# Patient Record
Sex: FEMALE
[De-identification: names, ages, dates, MRNs, and addresses within clinical notes are randomized per-mention and may not be internally consistent; named-entity substitution may affect disease eponyms.]

---

## 2017-04-20 VITALS — RESPIRATION RATE: 18 BRPM

## 2017-04-21 ENCOUNTER — HOSPITAL ENCOUNTER (OUTPATIENT)
Dept: HOSPITAL 14 - H.OPSURG | Age: 77
Discharge: HOME | End: 2017-04-21
Attending: PODIATRIST
Payer: MEDICARE

## 2017-04-21 VITALS
TEMPERATURE: 98.4 F | HEART RATE: 65 BPM | OXYGEN SATURATION: 96 % | DIASTOLIC BLOOD PRESSURE: 63 MMHG | SYSTOLIC BLOOD PRESSURE: 117 MMHG

## 2017-04-21 VITALS — BODY MASS INDEX: 28.7 KG/M2

## 2017-04-21 DIAGNOSIS — F41.9: ICD-10-CM

## 2017-04-21 DIAGNOSIS — M21.6X1: Primary | ICD-10-CM

## 2017-04-21 DIAGNOSIS — I25.2: ICD-10-CM

## 2017-04-21 DIAGNOSIS — I48.91: ICD-10-CM

## 2017-04-21 DIAGNOSIS — K21.9: ICD-10-CM

## 2017-04-21 DIAGNOSIS — E78.5: ICD-10-CM

## 2017-04-21 DIAGNOSIS — I11.0: ICD-10-CM

## 2017-04-21 DIAGNOSIS — I25.10: ICD-10-CM

## 2017-04-21 DIAGNOSIS — M77.8: ICD-10-CM

## 2017-04-21 DIAGNOSIS — I50.9: ICD-10-CM

## 2017-04-21 DIAGNOSIS — M21.621: ICD-10-CM

## 2017-04-21 PROCEDURE — 88304 TISSUE EXAM BY PATHOLOGIST: CPT

## 2017-04-21 PROCEDURE — 20680 REMOVAL OF IMPLANT DEEP: CPT

## 2017-04-21 PROCEDURE — 28173 RESECT METATARSAL TUMOR: CPT

## 2017-04-21 PROCEDURE — 97161 PT EVAL LOW COMPLEX 20 MIN: CPT

## 2017-04-21 PROCEDURE — 73620 X-RAY EXAM OF FOOT: CPT

## 2017-04-21 PROCEDURE — 97116 GAIT TRAINING THERAPY: CPT

## 2017-04-21 NOTE — CP.PCM.PN
Subjective





- Date & Time of Evaluation


Date of Evaluation: 17


Time of Evaluation: 10:45





- Subjective


Subjective: 


78 yo female patient seen and evaluated in [facility] this morning for pre-op 

evaluation. Patient is accompanied with family member. Patient says she has 

been having quite a bit of pain to the right foot. Says she had bunion surgery 

4 years ago and now has pain due to the plate. Also has noticed a bunion 

formation of the 5th toe, says this is painful when ambulating and gets 

calluses. Rates the pain as a 5/10 at its worst when ambulating. Takes Tylenol 

for pain which she says helps somewhat. Has a walker at home. Denies anything 

to eat or drink since midnight. Denies any other problems at this time. 





PMH: arrythmia (has pacemaker), thyroid disease, arthritis


ALL: NKDA


Meds: see MAR 


Surg Hx: right bunion surgery 4 years ago, stents, bypass


FH: mother (clotting disorder), father ( from stroke)


Soc Hx: lives at home with , past smoker (quite 30 years ago), denies 

etoh, denies illicit drug use





Objective





- Vital Signs/Intake and Output


Vital Signs (last 24 hours): 


 











Temp Pulse Resp BP Pulse Ox


 


 98.5 F   73   18   131/72   98 


 


 17 10:10  17 10:16  17 10:10  17 10:10  17 10:10











- Constitutional


Appears: Well, Non-toxic, No Acute Distress





- Extremities Exam


Additional comments: 


Bilateral lower extremity exam:





VASC- DP pulses palpable 2/4 BL, PT pulses palpable 1/4 BL, skin temp runs warm 

to cool bl, no pedal edema noted, diffuse varicosities noted to distal legs and 

lateral ankles bl





NEURO- gross pedal sensation is intact BL





DERM: no open wounds, there is a hyperkeratotic lesion (IPK) noted sub-

metatarsal 1 (right) foot as well as plantar-lateral aspect of the 5th 

metatarsal head (right), no erythema, no sings infection 





ORTHO: tenderness to palpation to IPK sub-met 1 (right), tenderness to 

palpation of plantar and lateral aspect of 5th met head (right), tenderness to 

palpation medial and dorsal aspect of 1st MTPJ (right), severe HAV deformity 

noted to left 1st MTPJ with rigid hammering of left 2nd digit





BIOMECHANICAL EXAM:


AJ: DF is restricted to 0 degrees past neutral BL


STJ: 25 degrees inversion, 15 degrees eversion BL


1st ray: limited DF noted BL (0mm right, 2mm left)


1st MTPJ: absent DF, PF on right side, joint is trackbound on left side


Pedal muscle strength: graded as a 5/5 in all directions 





GAIT EXAM: patient ambulates with walker for balance, antalgic gait noted, 

decreased henry, increased angle and base of gait, lack of propulsion on 

right side and lack of swing phase bl, increased midstance on left side, 





- Neurological Exam


Neurological Exam: Alert, Awake, Oriented x3





- Psychiatric Exam


Psychiatric exam: Normal Affect, Normal Mood





Assessment and Plan





- Assessment and Plan (Free Text)


Assessment: 


78 yo female patient w/ painful right foot secondary to 1) painful hardware, 2) 

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 











Plan: 


-Pt S&E in same day surgery


-NPO status confirmed with patient


-Pt is for surgery of the right foot today w/ Dr. Bernabe


-Medical clearance in chart 


-Pt is to be WBAT with surgical shoe and walker post-op.


-She is to f/u w/ Dr. Bernabe at the office next week

## 2017-04-21 NOTE — CP.SDSHP
Same Day Surgery H & P





- History


Proposed Procedure: Right foot 5th metatarsal osteotomy, removal painful 

hardware 1st metatarsalphlangeal joint, fibular sesamoidectomy


Pre-Op Diagnosis: hallux abductovalgus 5th metatarsophalangeal joint, painful 

hardware 1st metatarsophalangeal joint, painful sesamoid (all of right foot)





- Previous Medical/Surgical History


Cardiac: Arrhythmia (has pacemaker )


Endocrine/Metabolic: Thyroid Disease


Pain: 5.


Previous Surgical History: hallux abductovalgus repair (fursion) right 1st 

metatarophalangeal joint.  stents, bypass





- Allergies


Allergies: 


Allergies





No Known Allergies Allergy (Verified 04/20/17 10:22)


 











- Physical Exam


Vital Signs: 


 Vital Signs











  04/21/17 04/21/17





  10:10 10:16


 


Temperature 98.5 F 


 


Pulse Rate 73 73


 


Respiratory 18 





Rate  


 


Blood Pressure 131/72 


 


O2 Sat by Pulse 98 





Oximetry  











Mental Status: Alert & Oriented x3


Neuro: WNL





- {Optional Preform as Required}


Integument: Other (hyperkeratotic lesion submet 5 and 1 of right foot)


Ortho: Other (pain to palpation of dorsal and medial aspect of right 1st MTPJ, 

tenderness to palpation of plantar and lateral aspect 5th metatarsal head)





- Impression


Impression: Patient seen and evaluated in [facility]. Medical clearance in chart. NPO 

status confirmed. Pt is for right foot surgery today with Dr. Bernabe





Short Stay Discharge





- Short Stay Discharge


Admitting Diagnosis/Reason for Visit: M21.6X1 M21.621 M77.8


Disposition: HOME/ ROUTINE


Referrals: 


Santiago Carranza MD [Primary Care Provider] - 


Stephen Bernabe MD [Staff Provider] - 


Additional Instructions (Diet, Activity): 


-Rest, ice and elevate the right foot today.


-Keep dressing to foot clean, dry, intact, use cast bag over dressing for 

bathing/showering


-Take prescribed medications as directed 


-PWB to heel with walker


-Pt is advised to monitor for any signs of infection


-Pt to follow up with Dr. Bernabe at the office next week


Progress Note/Discharge Note with Instructions: 


-VSS, neurovascular status intact right foot


- (+) void, (+) appetite


-dressing to right foot appears clean dry and intact 


-PWB to heel with rolling walker 


-Follow up with Dr. Bernabe in office next week

## 2017-04-21 NOTE — RAD
PROCEDURE:  Right Foot Radiographs. For 



HISTORY:

Right foot surgery  



COMPARISON:

None.



FINDINGS:



BONES:

There is fusion of the 1st MTP joint and screw tracks in the 1st 

metatarsal and proximal phalanx of the great toe. There are 2 

metallic screws in the 5th metatarsal and an old fracture deformity. 

There is no acute fracture or bone destruction. There is a prominent 

dorsal calcaneal spur. 



JOINTS:

The remaining joint spaces are preserved. 



SOFT TISSUES:

There is moderate dorsal soft tissue swelling 



OTHER FINDINGS:

None.



IMPRESSION:

Old fracture deformity in the 5th metatarsal and 2 metallic screws in 

the 5th metatarsal. Moderate dorsal soft tissue swelling.

## 2017-04-21 NOTE — PCM.SURG1
Surgeon's Initial Post Op Note





- Surgeon's Notes


Surgeon: Dr. Bernabe


Assistant: Dr. Taiwo Arriaga


Type of Anesthesia: MAC, Local


Anesthesia Administered By: Dr. Suazo


Pre-Operative Diagnosis: Right foot painful hardware, painful exostosis of 1st 

metatarsal head, tailor's bunion deformity, and painful scar tissue to plantar 

aspect of 1st met head


Operative Findings: Materials: 2.0 x 10mm cortical screws.  3-0 Vicryl.  4-0 

Monocryl


Post-Operative Diagnosis: same


Operation Performed: Right foot removal of painful hardware, 5th metatarsal 

osteotomy with screws, resection of 1st metatarsal head, excision of painful 

scar tissue from plantar 1st met head


Specimen/Specimens Removed: 6 x screws and 1 x plate from right 1st MTPJ


Estimated Blood Loss: EBL {In ML}: 3


Blood Products Given: N/A


Drains Used: No Drains


Post-Op Condition: Good


Date of Surgery/Procedure: 04/21/17


Time of Surgery/Procedure: 11:40

## 2017-04-23 NOTE — OP
PROCEDURE DATE: 04/21/2017



SURGEON:  Dr. Bernabe



ASSISTANT:  Dr. Arriaga, PGY-1



ANESTHETIST:  Dr. Suazo 



ANESTHESIA:  General Mask and local.



PREOPERATIVE DIAGNOSES:

1.  Right foot painful hardware.

2.  Right foot painful scar tissue to plantar aspect of first metatarsal head.

3.  Right foot painful plantar exostosis of first metatarsal head.

4.  Right foot painful tailor's bunion.



POSTOPERATIVE DIAGNOSES:  

1.  Right foot painful hardware.

2.  Right foot painful scar tissue to plantar aspect of first metatarsal head.

3.  Right foot painful plantar exostosis of first metatarsal head.

4.  Right foot painful tailor's bunion.



NAME OF PROCEDURE:

1.  Right foot removal of painful hardware.

2.  Right foot excision of scar tissue from plantar aspect of first metatarsal 
head.

3.  Right foot resection of first metatarsal head.

4.  Right foot fifth metatarsal Timothy osteotomy.



INDICATIONS:  The patient is a 77-year-old female patient with the above 
diagnosis.  The patient has exhausted conservative treatment at this time and 
now requests surgical intervention.  The patient signed the consent after 
careful explanation of risks, benefits, complication and alternatives for 
surgical procedure.  No guarantees were given nor implied.  One gram of Ancef 
IV was given to the patient half hour prior to the procedure.  N.p.o. status 
was confirmed prior to taking patient to the OR.



PREPARATION:  The patient was brought to the operating room table and placed on 
the operating room table in supine position.  A well-padded pneumatic ankle 
tourniquet was placed to patient's right ankle.  After induction of general 
anesthesia, the patient received a total of 19 mL of 1:1 mixture of 1% 
lidocaine plain and 0.5% Marcaine plain in a local block fashion to the right 
ankle.  Once local anesthesia was achieved, the right lower extremity was then 
prepped and draped in usual sterile manner.  Esmarch was utilized to 
exsanguinate the patient's right foot.  Pneumatic ankle tourniquet was then 
inflated to 250 mmHg and the procedure began.



PROCEDURE: 

1.  Right foot removal of painful hardware.  Attention was directed to the 
dorsal aspect of the right first MTPJ.  Exact location of painful metal plate 
was identified by palpation of the prominent plate.  An approximately 5 cm 
longitudinal incision was made along the length of the plate with a #15 blade.  
The incision was deepened through the subcutaneous tissues using sharp and 
blunt dissection all the way down to the plate and bone.  Care was taken to 
identify and retract all vital neurovascular structures.  The plate and the 
screws  were visualized through the incision.  Using a proper Arthrex 
cannulated star shaped screwdriver, the screws were then removed from the right 
first MTPJ.  The plate was then removed from the right first MTPJ utilizing a 
Custer elevator and a hemostat.  The wound was then flushed with a copious 
amount of sterile normal saline solution.  The subcutaneous tissue of the 
incision site was then reapproximated and coapted utilizing #3-0 Vicryl.  The 
skin was then reapproximated and coapted using 4-0 Monocryl with running suture 
technique.

2.  Right foot excision of scar tissue from plantar aspect of first metatarsal 
head.  Attention was then directed to the plantar aspect of the right first 
metatarsal head where there is a hyperkeratotic lesion measuring about 0.5 cm 
in diameter.  At this point, a #15 blade was used to make elliptical incision 
on the margin of the lesion plantar.  Using a brown adson, the lesion was 
lifted and deeper incision was made using the blade.  This hyperkeratotic 
lesion was then removed from the plantar aspect of the right foot first 
metatarsal head and was passed from the operative field to the sterile Bucio 
stand.

3.  Right foot resection of first metatarsal head.  Once the excision of the 
scar tissue was done, prominent exostosis of the plantar lateral aspect of the 
right first metatarsal head was palpated through the plantar opening where the 
scar tissue was removed from.  The incision was deepened through the 
subcutaneous tissues using sharp and blunt dissection all the way down to the 
bone.  Care was taken to identify and retract all vital neurovascular 
structures.  Once the prominent exostosis of right first metatarsal head was 
exposed through the incision,  a rongeur was used to snip off the prominent 
exostosis from the plantar lateral aspect of the first metatarsal head.  The 
resected bony exostosis was then passed from the operative field to sterile 
Bucio stand.  The wound was then flushed with copious amount of sterile normal 
saline solution.  The subcutaneous tissue of the incision site was then 
reapproximated and coapted utilizing 3-0 Vicryl.  The skin was then 
reapproximated and coapted utilizing 4-0 Prolene with simple suture technique.

4.  Right foot fifth metatarsal Timothy osteotomy.  Attention was then directed to 
the dorsal aspect of the fifth metatarsal of the right foot.  Utilizing a #15 
blade, approximately a 5 cm longitudinal incision was made on the dorsal aspect 
of the right foot fifth metatarsal.  The incision was deepened through the 
subcutaneous tissue with care being taken to identify and retract all vital 
neurovascular structures.  All bleeders were cauterized and ligated as 
necessary.  At this time, a linear periosteal and capsular incision was made 
overlying the fifth metatarsal of the right foot.  The periosteal and capsular 
structures were then carefully dissected free of their osseous attachments and 
reflected medially and laterally, thus exposing the head and shaft of the fifth 
metatarsal.  Once the head of the right fifth metatarsal head was exposed, it 
was noted that the lateral aspect of the fifth metatarsal was already resected 
from a previous surgery.  Next, utilizing a sagittal saw, a dorsal distal to 
plantar proximal osteotomy was created in the distal one-half of the fifth 
metatarsal diaphysis.  Upon completion of the osteotomy, the capital fragment 
was distracted and shifted medially into a corrected position and held in place 
utilizing a bone clamp.  Next, two 2.0 x 10 mm cortical screws were inserted 
into their places across the osteotomy site with excellent compression noted.  
The remaining bone clamp was removed.  Attention was then directed to the 
remaining lateral bone shelf which was resected utilizing the sagittal bone 
saw.  All rough edges were smoothed down with a hand rasp.  The wound was then 
flushed with copious amounts of sterile normal saline.  The periosteal and 
capsular structures were reapproximated and coapted utilizing 3-0 Vicryl.  The 
subcuticular tissues were then reapproximated and coapted utilizing 4-0 Vicryl.
  The subcuticular was then reapproximated and coapted using 4-0 Monocryl in a 
running suture technique.  



Right foot surgical sites were injected with total of 10 mL of 0.5% Marcaine 
plain and 3 mL of dexamethasone phosphate.  Right foot was dressed with Steri-
Strips, DSD and Tensoplast.  Pneumatic ankle tourniquet was then deflated and 
prompt hyperemic response was noted to all digits of the right foot.  The 
attending was present the entire case.



POSTOPERATIVE CONDITION:  The patient tolerated the anesthesia and procedure 
well and was escorted to the recovery room with vital signs stable and 
neurovascular status intact to the right foot.  The patient will be 
nonweightbearing in surgical shoe with crutches and follow up with Dr. Bernabe.





__________________________________________

KAMRAN ARRIAGA DPM



__________________________________________

Stephen Bernabe DPM







cc:   



DD: 04/23/2017 18:57:55  [id number]

TT: 04/23/2017 21:03:37

Job # [id number]

sn

MTDD

## 2017-10-24 ENCOUNTER — HOSPITAL ENCOUNTER (INPATIENT)
Dept: HOSPITAL 14 - H.ER | Age: 77
LOS: 1 days | Discharge: HOME HEALTH | DRG: 293 | End: 2017-10-25
Attending: INTERNAL MEDICINE | Admitting: INTERNAL MEDICINE
Payer: MEDICARE

## 2017-10-24 VITALS — BODY MASS INDEX: 27.1 KG/M2

## 2017-10-24 DIAGNOSIS — Z95.810: ICD-10-CM

## 2017-10-24 DIAGNOSIS — Z79.82: ICD-10-CM

## 2017-10-24 DIAGNOSIS — E03.9: ICD-10-CM

## 2017-10-24 DIAGNOSIS — E78.5: ICD-10-CM

## 2017-10-24 DIAGNOSIS — Z95.1: ICD-10-CM

## 2017-10-24 DIAGNOSIS — I50.23: ICD-10-CM

## 2017-10-24 DIAGNOSIS — D50.9: ICD-10-CM

## 2017-10-24 DIAGNOSIS — M81.0: ICD-10-CM

## 2017-10-24 DIAGNOSIS — I25.10: ICD-10-CM

## 2017-10-24 DIAGNOSIS — I48.2: ICD-10-CM

## 2017-10-24 DIAGNOSIS — I11.0: Primary | ICD-10-CM

## 2017-10-24 DIAGNOSIS — F41.9: ICD-10-CM

## 2017-10-24 DIAGNOSIS — Z87.891: ICD-10-CM

## 2017-10-24 DIAGNOSIS — K21.9: ICD-10-CM

## 2017-10-24 DIAGNOSIS — I25.2: ICD-10-CM

## 2017-10-24 DIAGNOSIS — E87.6: ICD-10-CM

## 2017-10-24 LAB
ALBUMIN/GLOB SERPL: 1.3 {RATIO} (ref 1–2.1)
ALP SERPL-CCNC: 117 U/L (ref 38–126)
ALT SERPL-CCNC: 54 U/L (ref 9–52)
APTT BLD: 32.2 SECONDS (ref 25.6–37.1)
AST SERPL-CCNC: 43 U/L (ref 14–36)
BASOPHILS # BLD AUTO: 0 K/UL (ref 0–0.2)
BASOPHILS NFR BLD: 0.3 % (ref 0–2)
BILIRUB SERPL-MCNC: 1.1 MG/DL (ref 0.2–1.3)
BILIRUB UR-MCNC: NEGATIVE MG/DL
BUN SERPL-MCNC: 26 MG/DL (ref 7–17)
CALCIUM SERPL-MCNC: 8.9 MG/DL (ref 8.4–10.2)
CHLORIDE SERPL-SCNC: 101 MMOL/L (ref 98–107)
CO2 SERPL-SCNC: 27 MMOL/L (ref 22–30)
COLOR UR: (no result)
EOSINOPHIL # BLD AUTO: 0 K/UL (ref 0–0.7)
EOSINOPHIL NFR BLD: 0.6 % (ref 0–4)
ERYTHROCYTE [DISTWIDTH] IN BLOOD BY AUTOMATED COUNT: 17.4 % (ref 11.5–14.5)
GLOBULIN SER-MCNC: 3.4 GM/DL (ref 2.2–3.9)
GLUCOSE SERPL-MCNC: 130 MG/DL (ref 65–105)
GLUCOSE UR STRIP-MCNC: (no result) MG/DL
HCT VFR BLD CALC: 32.8 % (ref 34–47)
KETONES UR STRIP-MCNC: NEGATIVE MG/DL
LEUKOCYTE ESTERASE UR-ACNC: (no result) LEU/UL
LYMPHOCYTES # BLD AUTO: 1 K/UL (ref 1–4.3)
LYMPHOCYTES NFR BLD AUTO: 16.3 % (ref 20–40)
MCH RBC QN AUTO: 25.4 PG (ref 27–31)
MCHC RBC AUTO-ENTMCNC: 33.1 G/DL (ref 33–37)
MCV RBC AUTO: 76.6 FL (ref 81–99)
MONOCYTES # BLD: 0.5 K/UL (ref 0–0.8)
MONOCYTES NFR BLD: 8.3 % (ref 0–10)
NEUTROPHILS # BLD: 4.8 K/UL (ref 1.8–7)
NEUTROPHILS NFR BLD AUTO: 74.5 % (ref 50–75)
NRBC BLD AUTO-RTO: 0.1 % (ref 0–0)
PH UR STRIP: 6 [PH] (ref 5–8)
PLATELET # BLD: 177 K/UL (ref 130–400)
PMV BLD AUTO: 9.5 FL (ref 7.2–11.7)
POTASSIUM SERPL-SCNC: 3.3 MMOL/L (ref 3.6–5)
PROT SERPL-MCNC: 7.6 G/DL (ref 6.3–8.2)
PROT UR STRIP-MCNC: NEGATIVE MG/DL
RBC # UR STRIP: (no result) /UL
RBC #/AREA URNS HPF: < 1 /HPF (ref 0–3)
SODIUM SERPL-SCNC: 140 MMOL/L (ref 132–148)
SP GR UR STRIP: 1.01 (ref 1–1.03)
UROBILINOGEN UR-MCNC: (no result) MG/DL (ref 0.2–1)
WBC # BLD AUTO: 6.4 K/UL (ref 4.8–10.8)

## 2017-10-24 NOTE — CP.PCM.HP
History of Present Illness





- History of Present Illness


History of Present Illness: 





                                                        this 77-year-old female 

came to the emergency room after multiple attempts to relieve her of shortness 

of breath and pedal edema at home by using oral furosemide and Aldactone were 

unsuccessful. The patient has a long medical history which consists of coronary 

artery disease causing a myocardial infarction in early 80s. The patient 

subsequently underwent coronary bypass graft surgery and also has had stenting 

procedures approximately 5-6 years back. She has had severe left ventricular 

systolic dysfunction and has had an AICD implanted more than 6 years back. The 

patient subsequently developed atrial fibrillation couple of years back and had 

had significant congestive cardiac failure treated with oral Diuril takes along 

with an ACE inhibitor and a beta blocker. She has never been a diabetic. The 

patient has undergone a cardioversion approximately 2 months back so as to 

improve her cardiac output and relieve symptoms of congestive cardiac failure. 

She has had biventricular pacing. Subsequently she reverted to atrial 

fibrillation and couple of weeks back she underwent AV node ablation and now is 

dependent on biventricular VVIR pacing.


She recently developed a mild shortness of breath and a persistent pedal edema 

which was not relieved in spite of doubling her furosemide dose and adding 

Aldactone. In fact her weight went up by a pound and the patient finally 

decided to come to the emergency room. She denies any orthopnea. She denies any 

salt loading.


On physical examination this is an elderly female who is quite alert awake and 

coherent. Afebrile with a pulse rate of 88 bpm and regular and cardiac monitor 

shows VVI paced rhythm.her blood pressure was 124/72 mmHg. Her jugular venous 

pressure was mildly elevated and there was pitting edema particularly over the 

right lower extremity  from which venous harvesting had been done in the past. 

There was a mild pitting edema over the left lower extremity as well. her 

extremities were warm and the nailbeds were pink. There was no central or 

peripheral cyanosis.Scar of sternotomy was evident. The apex was palpable in 

the 60s slightly heaving in character. First and second heart sounds were 

normal with a brief apical systolic murmur of mitral regurgitation. There were 

few basal rales.


Electro-cardiogram showed presence of atrial fibrillation with a VVI paced 

rhythm. Chest x-ray showed cardiomegaly with mild pulmonary congestion. 

Ultrasound of lower extremities failed to show any evidence of DVT.


Her lab data showed microcytic hypochromic anemia with a hemoglobin of 10.9 g 

and 32.8% respectively. Her WBC count and platelet counts are within normal 

limits. Her BUN and creatinine were 26 and 1 mg percent respectively with a 

potassium level of 3.3 mg/L. Her pro BNP was markedly elevated troponin was 

normal.





Impression: congestive cardiac failure which is left ventricular systolic and 

acute on chronic. Coronary artery disease with prior myocardial infarcts. 

Status post coronary bypass graft surgery. Chronic atrial fibrillation. 

Hypertension and dyslipidemia. Status post AV ablation with AICD implant and 

biventricular pacing.


The patient has received intravenous furosemide in an attempt to diurese her.





Present on Admission





- Present on Admission


Any Indicators Present on Admission: No





Past Patient History





- Infectious Disease


Hx of Infectious Diseases: None





- Past Medical History & Family History


Past Medical History?: Yes





- Past Social History


Alcohol: None


Drugs: Denies





- CARDIAC


Hx Congestive Heart Failure: Yes


Hx Hypercholesterolemia: Yes


Hx Hypertension: Yes


Hx Pacemaker: Yes (2012)





- PULMONARY


Hx Respiratory Disorders: No





- NEUROLOGICAL


Hx Neurological Disorder: No





- HEENT


Hx HEENT Problems: No





- RENAL


Hx Chronic Kidney Disease: No





- ENDOCRINE/METABOLIC


Hx Hypothyroidism: Yes





- HEMATOLOGICAL/ONCOLOGICAL


Hx Blood Disorders: Yes


Hx Bruising: Yes





- INTEGUMENTARY


Hx Dermatological Problems: No





- MUSCULOSKELETAL/RHEUMATOLOGICAL


Hx Arthritis: Yes


Hx Osteoporosis: Yes





- GASTROINTESTINAL


Hx Gastrointestinal Disorders: Yes


Hx Gastroesophageal Reflux: Yes (HEARTBURN)





- GENITOURINARY/GYNECOLOGICAL


Hx Genitourinary Disorders: No





- PSYCHIATRIC


Hx Anxiety: Yes





- SURGICAL HISTORY


Hx Coronary Artery Bypass Graft: Yes (CARDIAC [id number])





- ANESTHESIA


Hx Anesthesia: Yes


Hx Anesthesia Reactions: No


Hx Malignant Hyperthermia: No





Meds


Allergies/Adverse Reactions: 


 Allergies











Allergy/AdvReac Type Severity Reaction Status Date / Time


 


No Known Allergies Allergy   Verified 04/20/17 10:22














Results





- Vital Signs


Recent Vital Signs: 





 Last Vital Signs











Temp  98.1 F   10/24/17 12:26


 


Pulse  84   10/24/17 12:26


 


Resp  18   10/24/17 12:26


 


BP  104/83   10/24/17 12:26


 


Pulse Ox  95   10/24/17 12:32














- Labs


Result Diagrams: 


 10/24/17 10:50





 10/24/17 10:50


Labs: 





 Laboratory Results - last 24 hr











  10/24/17 10/24/17 10/24/17





  10:50 10:50 10:50


 


WBC   6.4 


 


RBC   4.29 


 


Hgb   10.9 L 


 


Hct   32.8 L 


 


MCV   76.6 L 


 


MCH   25.4 L 


 


MCHC   33.1 


 


RDW   17.4 H 


 


Plt Count   177 


 


MPV   9.5 


 


Neut % (Auto)   74.5 


 


Lymph % (Auto)   16.3 L 


 


Mono % (Auto)   8.3 


 


Eos % (Auto)   0.6 


 


Baso % (Auto)   0.3 


 


Neut #   4.8 


 


Lymph #   1.0 


 


Mono #   0.5 


 


Eos #   0.0 


 


Baso #   0.0 


 


PT    24.0 H


 


INR    2.1 H


 


APTT    32.2


 


Sodium  140  


 


Potassium  3.3 L  


 


Chloride  101  


 


Carbon Dioxide  27  


 


Anion Gap  15  


 


BUN  26 H  


 


Creatinine  1.0  


 


Est GFR ( Amer)  > 60  


 


Est GFR (Non-Af Amer)  53  


 


Random Glucose  130 H  


 


Calcium  8.9  


 


Total Bilirubin  1.1  


 


AST  43 H  


 


ALT  54 H  


 


Alkaline Phosphatase  117  


 


Troponin I  0.0300  


 


NT-Pro-B Natriuret Pep  48524 H  


 


Total Protein  7.6  


 


Albumin  4.2  


 


Globulin  3.4  


 


Albumin/Globulin Ratio  1.3  


 


Urine Color   


 


Urine Clarity   


 


Urine pH   


 


Ur Specific Gravity   


 


Urine Protein   


 


Urine Glucose (UA)   


 


Urine Ketones   


 


Urine Blood   


 


Urine Nitrate   


 


Urine Bilirubin   


 


Urine Urobilinogen   


 


Ur Leukocyte Esterase   


 


Urine RBC (Auto)   














  10/24/17





  11:57


 


WBC 


 


RBC 


 


Hgb 


 


Hct 


 


MCV 


 


MCH 


 


MCHC 


 


RDW 


 


Plt Count 


 


MPV 


 


Neut % (Auto) 


 


Lymph % (Auto) 


 


Mono % (Auto) 


 


Eos % (Auto) 


 


Baso % (Auto) 


 


Neut # 


 


Lymph # 


 


Mono # 


 


Eos # 


 


Baso # 


 


PT 


 


INR 


 


APTT 


 


Sodium 


 


Potassium 


 


Chloride 


 


Carbon Dioxide 


 


Anion Gap 


 


BUN 


 


Creatinine 


 


Est GFR ( Amer) 


 


Est GFR (Non-Af Amer) 


 


Random Glucose 


 


Calcium 


 


Total Bilirubin 


 


AST 


 


ALT 


 


Alkaline Phosphatase 


 


Troponin I 


 


NT-Pro-B Natriuret Pep 


 


Total Protein 


 


Albumin 


 


Globulin 


 


Albumin/Globulin Ratio 


 


Urine Color  Straw


 


Urine Clarity  Clear


 


Urine pH  6.0


 


Ur Specific Gravity  1.006


 


Urine Protein  Negative


 


Urine Glucose (UA)  Neg


 


Urine Ketones  Negative


 


Urine Blood  Small


 


Urine Nitrate  Negative


 


Urine Bilirubin  Negative


 


Urine Urobilinogen  0.2-1.0


 


Ur Leukocyte Esterase  Neg


 


Urine RBC (Auto)  < 1

## 2017-10-24 NOTE — ED PDOC
HPI: General Adult


Time Seen by Provider: 10/24/17 10:23


Chief Complaint (Nursing): Lower Extremity Problem/Injury


Chief Complaint (Provider): ankle swelling


History Per: Patient


Additional Complaint(s): 


77-year-old female with history of heart disease presents with swelling in 

ankles that started about one month ago with intermittent shortness of breath. 

Patient takes 40 mg Lasix daily but still feels short of breath from time to 

time. Patient was sent to the emergency room by her cardiologist Dr. Longoria. 

Denies any chest pain, cough, fever or chills.  No recent travel. 





Past Medical History


Reviewed: Historical Data, Nursing Documentation, Vital Signs


Vital Signs: 


 Last Vital Signs











Temp  98.1 F   10/24/17 12:50


 


Pulse  84   10/24/17 12:50


 


Resp  18   10/24/17 12:50


 


BP  104/83   10/24/17 12:50


 


Pulse Ox  95   10/24/17 12:32














- Medical History


PMH: Anxiety, Arthritis, CHF, HTN, Hypercholesterolemia, Hypothyroidism, 

Osteoporosis





- Surgical History


Surgical History: CABG (CARDIAC [id number]), Pacemaker ()





- Family History


Family History: States: No Known Family Hx





- Social History


Current smoker - smoking cessation education provided: No


Ex-Smoker (has not smoked in the last 12 months): Yes (quit several years ago)


Alcohol: None


Drugs: Denies





- Home Medications


Home Medications: 


 Ambulatory Orders











 Medication  Instructions  Recorded


 


ALPRAZolam [Xanax] 0.25 mg PO HS 17


 


Aspirin [Ecotrin] 325 mg PO DAILY 17


 


Cholecalciferol (Vitamin D3) 2,000 unit PO DAILY 17





[Vitamin D3]  


 


Furosemide [Lasix] 40 mg PO DAILY 17


 


Levothyroxine [Synthroid] 75 mcg PO DAILY 17


 


PARoxetine [Paxil] 10 mg PO DAILY 17


 


Rivaroxaban [Xarelto] 20 mg PO QPM 17


 


Valsartan [Diovan] 160 mg PO DAILY 17


 


Atorvastatin [Lipitor] 20 mg PO HS 10/24/17


 


Isosorbide Mononitrate [Ismo] 20 mg PO DAILY 10/24/17


 


Metoprolol Succinate [Toprol XL] 50 mg PO DAILY 10/24/17


 


Spironolactone [Aldactone] 50 mg PO DAILY 10/24/17














- Allergies


Allergies/Adverse Reactions: 


 Allergies











Allergy/AdvReac Type Severity Reaction Status Date / Time


 


No Known Allergies Allergy   Verified 17 10:22














Review of Systems


ROS Statement: Except As Marked, All Systems Reviewed And Found Negative


Constitutional: Negative for: Fever, Chills, Weakness


Cardiovascular: Positive for: Edema (bilateral ankles).  Negative for: Chest 

Pain, Light Headedness


Respiratory: Positive for: Shortness of Breath.  Negative for: Cough


Gastrointestinal: Negative for: Nausea, Vomiting, Abdominal Pain


Neurological: Negative for: Headache, Dizziness





Physical Exam





- Reviewed


Nursing Documentation Reviewed: Yes


Vital Signs Reviewed: Yes





- Physical Exam


Appears: Positive for: Well, Non-toxic, No Acute Distress


Skin: Negative for: Rash


Eye Exam: Positive for: Normal appearance, EOMI, PERRL


Cardiovascular/Chest: Positive for: Regular Rate, Rhythm


Respiratory: Positive for: Normal Breath Sounds.  Negative for: Respiratory 

Distress


Back: Positive for: Normal Inspection


Extremity: Positive for: Pedal Edema (1+ pitting edema to bilateral ankles)


Neurologic/Psych: Positive for: Alert, Oriented





- Laboratory Results


Result Diagrams: 


 10/24/17 10:50





 10/24/17 10:50





- ECG


O2 Sat by Pulse Oximetry: 95


Pulse Ox Interpretation: Normal





- Other Rad


  ** Portable chest


X-Ray: Interpreted by Me, Viewed By Me


X-Ray Interpretation: cardiomegaly and vascular congestion





  ** Duplex lower extremities bilaterally


X-Ray: Read By Radiologist


X-Ray Interpretation: no DVT





Medical Decision Making


Medical Decision Makin year old female with swelling to both ankles and shortness of breath x 1 

month





Plan:


CBC


CMP


Trop


BNP


UA


IV insertion


Bedside chest


EKG





I spoke with Dr. Longoria, he states to order dopplers of both legs to rule out 

DVT and admit patient.  Patient is aware of and agrees with admission.





K is low at 3.3, 40 meq of Kdur given orally. 





INITIAL WEIGHT IN ED: 145.8 LBS





Disposition





- Clinical Impression


Clinical Impression: 


 Pedal edema, CHF (congestive heart failure)








- Patient ED Disposition


Is Patient to be Admitted: Yes





- Disposition


Disposition Time: 13:15


Condition: FAIR





- Pt Status Changed To:


Hospital Disposition Of: Inpatient





- Admit Certification


Admit to Inpatient:: After my assessment, the patient will require 

hospitalization for at least two midnights.  This is because of the severity of 

symptoms shown, intensity of services needed, and/or the medical risk in this 

patient being treated as an outpatient.





- POA


Present On Arrival: None





Results





- Lab Results


Lab Results: 














  10/24/17 10/24/17 10/24/17





  10:50 10:50 10:50


 


WBC   6.4 


 


RBC   4.29 


 


Hgb   10.9 L 


 


Hct   32.8 L 


 


MCV   76.6 L 


 


MCH   25.4 L 


 


MCHC   33.1 


 


RDW   17.4 H 


 


Plt Count   177 


 


MPV   9.5 


 


Neut % (Auto)   74.5 


 


Lymph % (Auto)   16.3 L 


 


Mono % (Auto)   8.3 


 


Eos % (Auto)   0.6 


 


Baso % (Auto)   0.3 


 


Neut #   4.8 


 


Lymph #   1.0 


 


Mono #   0.5 


 


Eos #   0.0 


 


Baso #   0.0 


 


PT  24.0 H  


 


INR  2.1 H  


 


APTT  32.2  


 


Sodium    140


 


Potassium    3.3 L


 


Chloride    101


 


Carbon Dioxide    27


 


Anion Gap    15


 


BUN    26 H


 


Creatinine    1.0


 


Est GFR ( Amer)    > 60


 


Est GFR (Non-Af Amer)    53


 


Random Glucose    130 H


 


Calcium    8.9


 


Total Bilirubin    1.1


 


AST    43 H


 


ALT    54 H


 


Alkaline Phosphatase    117


 


Troponin I    0.0300


 


NT-Pro-B Natriuret Pep    76274 H


 


Total Protein    7.6


 


Albumin    4.2


 


Globulin    3.4


 


Albumin/Globulin Ratio    1.3

## 2017-10-24 NOTE — US
PROCEDURE:  Bilateral lower extremity venous duplex Doppler. 



HISTORY:

swelling to both legs



COMPARISON:

None available. 



TECHNIQUE:

Bilateral common femoral, superficial femoral, popliteal and 

posterior tibial veins were evaluated. Flow was assessed with color 

Doppler, compressibility, assessment of phasic flow and augmentation 

response. 



FINDINGS:



COMMON FEMORAL VEIN:

Right CFV:  Unremarkable.



Left CFV:  Unremarkable.



SUPERFICIAL FEMORAL VEIN:

Right SFV: Unremarkable.



Left SFV:  Unremarkable.



POPLITEAL VEIN:

Right Popliteal:  Unremarkable.



Left Popliteal:  Unremarkable.



POSTERIOR TIBIAL VEIN:

Right PTV: Unremarkable.



Left PTV: Unremarkable.



OTHER FINDINGS:

None.



IMPRESSION:

No evidence of deep venous thrombosis.

## 2017-10-24 NOTE — RAD
HISTORY:

swelling in ankles  



COMPARISON:

04/20/2017. 



FINDINGS:



LUNGS:

The lungs are well inflated. There is mild pulmonary venous 

congestion. No focal consolidation. 



PLEURA:

No significant pleural effusion identified, no pneumothorax apparent.



CARDIOVASCULAR:

There is mild cardiomegaly.  There is stable position of left-sided 

dual lead transvenous permanent pacing device. Status post CABG.



OSSEOUS STRUCTURES:

No significant abnormalities.



VISUALIZED UPPER ABDOMEN:

Normal.



OTHER FINDINGS:

None.



IMPRESSION:

Mild cardiomegaly and pulmonary venous congestion. No acute findings.

## 2017-10-24 NOTE — CARD
--------------- APPROVED REPORT --------------





EKG Measurement

Heart [id number]

[id number]

[id number]

[id number]



<Conclusion>

Wide QRS rhythm with frequent ventricular-paced complexes and fusion 

complexes

Right bundle branch block

Possible Anterolateral infarct, age undetermined

T wave abnormality, consider inferior ischemia

Abnormal ECG

## 2017-10-25 VITALS
HEART RATE: 89 BPM | TEMPERATURE: 97.5 F | SYSTOLIC BLOOD PRESSURE: 103 MMHG | DIASTOLIC BLOOD PRESSURE: 62 MMHG | OXYGEN SATURATION: 94 % | RESPIRATION RATE: 20 BRPM

## 2017-10-25 LAB
BUN SERPL-MCNC: 26 MG/DL (ref 7–17)
CALCIUM SERPL-MCNC: 9.1 MG/DL (ref 8.4–10.2)
CHLORIDE SERPL-SCNC: 100 MMOL/L (ref 98–107)
CO2 SERPL-SCNC: 33 MMOL/L (ref 22–30)
GLUCOSE SERPL-MCNC: 102 MG/DL (ref 65–105)
POTASSIUM SERPL-SCNC: 3.7 MMOL/L (ref 3.6–5)
SODIUM SERPL-SCNC: 141 MMOL/L (ref 132–148)

## 2017-10-25 NOTE — CP.PCM.PN
Subjective





- Date & Time of Evaluation


Date of Evaluation: 10/25/17


Time of Evaluation: 08:00





- Subjective


Subjective: 





                                                    Diuresed profusely with IV 

Lasix and is free of dyspnoea and pedal oedema


                                                    VVI paced rhythm on 

telemetry


                                                    /74 mm Hg


                                                    JVP flat 


                                                    No gallop, no rales


                                                    Today's labs: K+ normal


                                                    BUN/Creatinin stable


                                                    To go home today





Objective





- Vital Signs/Intake and Output


Vital Signs (last 24 hours): 


 











Temp Pulse Resp BP Pulse Ox


 


 98.0 F   84   18   115/70   98 


 


 10/25/17 08:16  10/25/17 08:16  10/25/17 08:16  10/25/17 08:16  10/25/17 08:16











- Medications


Medications: 


 Current Medications





Alprazolam (Xanax)  0.25 mg PO HS [facility]


   Stop: 10/31/17 22:01


   Last Admin: 10/24/17 21:29 Dose:  0.25 mg


Aspirin (Ecotrin)  325 mg PO DAILY [facility]


Atorvastatin Calcium (Lipitor)  20 mg PO HS [facility]


   Last Admin: 10/24/17 21:29 Dose:  20 mg


Cholecalciferol (Vitamin D)  2,000 iu PO DAILY [facility]


Isosorbide Mononitrate (Imdur Er)  30 mg PO DAILY [facility]


Levothyroxine Sodium (Synthroid)  75 mcg PO DAILY [facility]


Metoprolol Succinate (Toprol Xl)  50 mg PO DAILY [facility]


Paroxetine HCl (Paxil)  10 mg PO DAILY [facility]


Rivaroxaban (Xarelto)  15 mg PO QPM [facility]


   PRN Reason: Protocol


   Last Admin: 10/24/17 17:29 Dose:  15 mg


Spironolactone (Aldactone)  50 mg PO DAILY [facility]


Valsartan (Diovan)  160 mg PO DAILY [facility]











- Labs


Labs: 


 





 10/24/17 10:50 





 10/25/17 04:20 





 











PT  24.0 Seconds (9.8-13.1)  H  10/24/17  10:50    


 


INR  2.1  (0.9-1.2)  H  10/24/17  10:50    


 


APTT  32.2 Seconds (25.6-37.1)   10/24/17  10:50

## 2017-10-25 NOTE — PQF GENQUE
Dr. Longoria,



Is there an associated dx. to go along with the following lab value :Potassium 
level:3.3->3.7



OR: Disagree



K-Dur 





***** This form is a permanent part of the medical record*****





          

Clarification of your documentation is requested to better reflect the severity 
of illness and intensity of treatment of your patient.  



Indicators present      



[]  Specify: []

         



[]  Specify: []         

 



[]  Specify: []         





[]  Specify: []         





Location in the medical record that reflects the above clinical findings:  []

 



Treatment Provided:  []

  

PHYSICIAN'S RESPONSE



Yes. Patient has hypokalemia.

Based on your medical judgment of the clinical indicators outlined above please 
clarify the following:



[]  Practitioner response 



[]  If unable to determine, please check the box, sign and date.  





Present On Admission (POA) Indicator:





[] Present at the time of admission 



[] Not present at the time of admission



[] Clinically Undetermined









In responding to this query, please exercise your independent professional 
judgment.  The fact that a question is asked does not imply that any particular 
answer is desired or expected.  Thank you for your clarification on this 
documentation.



If you have any questions please call.

* Thank you,

   Dot Nichole RN 

   ext. #[phone]: Ofe Davalos RN
MTDD

## 2018-02-15 ENCOUNTER — HOSPITAL ENCOUNTER (INPATIENT)
Dept: HOSPITAL 14 - H.ER | Age: 78
LOS: 8 days | Discharge: NURSING, REHAB OR LONG-TERM CARE | DRG: 683 | End: 2018-02-23
Attending: INTERNAL MEDICINE | Admitting: INTERNAL MEDICINE
Payer: MEDICARE

## 2018-02-15 VITALS — BODY MASS INDEX: 23.9 KG/M2

## 2018-02-15 DIAGNOSIS — Z87.891: ICD-10-CM

## 2018-02-15 DIAGNOSIS — Z95.5: ICD-10-CM

## 2018-02-15 DIAGNOSIS — Z95.1: ICD-10-CM

## 2018-02-15 DIAGNOSIS — K25.9: ICD-10-CM

## 2018-02-15 DIAGNOSIS — F32.9: ICD-10-CM

## 2018-02-15 DIAGNOSIS — R79.89: ICD-10-CM

## 2018-02-15 DIAGNOSIS — I11.0: ICD-10-CM

## 2018-02-15 DIAGNOSIS — I48.2: ICD-10-CM

## 2018-02-15 DIAGNOSIS — E03.9: ICD-10-CM

## 2018-02-15 DIAGNOSIS — Z79.01: ICD-10-CM

## 2018-02-15 DIAGNOSIS — E86.1: ICD-10-CM

## 2018-02-15 DIAGNOSIS — M19.90: ICD-10-CM

## 2018-02-15 DIAGNOSIS — M81.0: ICD-10-CM

## 2018-02-15 DIAGNOSIS — T50.2X5A: ICD-10-CM

## 2018-02-15 DIAGNOSIS — R74.8: ICD-10-CM

## 2018-02-15 DIAGNOSIS — D50.0: ICD-10-CM

## 2018-02-15 DIAGNOSIS — I50.22: ICD-10-CM

## 2018-02-15 DIAGNOSIS — I34.0: ICD-10-CM

## 2018-02-15 DIAGNOSIS — K21.9: ICD-10-CM

## 2018-02-15 DIAGNOSIS — R12: ICD-10-CM
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
## 2018-02-15 DIAGNOSIS — I25.2: ICD-10-CM

## 2018-02-15 DIAGNOSIS — N17.9: Primary | ICD-10-CM

## 2018-02-15 DIAGNOSIS — I25.10: ICD-10-CM

## 2018-02-15 DIAGNOSIS — F41.9: ICD-10-CM

## 2018-02-15 DIAGNOSIS — E78.00: ICD-10-CM

## 2018-02-15 DIAGNOSIS — E86.0: ICD-10-CM

## 2018-02-15 DIAGNOSIS — Z95.810: ICD-10-CM

## 2018-02-15 DIAGNOSIS — Z79.899: ICD-10-CM

## 2018-02-15 LAB
ALBUMIN SERPL-MCNC: 3.9 G/DL (ref 3.5–5)
ALBUMIN/GLOB SERPL: 1.1 {RATIO} (ref 1–2.1)
ALT SERPL-CCNC: 61 U/L (ref 9–52)
APTT BLD: 31.4 SECONDS (ref 25.6–37.1)
AST SERPL-CCNC: 50 U/L (ref 14–36)
BASOPHILS # BLD AUTO: 0 K/UL (ref 0–0.2)
BASOPHILS NFR BLD: 0.8 % (ref 0–2)
BUN SERPL-MCNC: 75 MG/DL (ref 7–17)
CALCIUM SERPL-MCNC: 9.1 MG/DL (ref 8.4–10.2)
EOSINOPHIL # BLD AUTO: 0 K/UL (ref 0–0.7)
EOSINOPHIL NFR BLD: 0.1 % (ref 0–4)
ERYTHROCYTE [DISTWIDTH] IN BLOOD BY AUTOMATED COUNT: 18.8 % (ref 11.5–14.5)
GFR NON-AFRICAN AMERICAN: 20
HGB BLD-MCNC: 10 G/DL (ref 12–16)
INR PPP: 4.1 (ref 0.9–1.2)
LYMPHOCYTES # BLD AUTO: 1 K/UL (ref 1–4.3)
LYMPHOCYTES NFR BLD AUTO: 21.4 % (ref 20–40)
MCH RBC QN AUTO: 23 PG (ref 27–31)
MCHC RBC AUTO-ENTMCNC: 30.3 G/DL (ref 33–37)
MCV RBC AUTO: 75.8 FL (ref 81–99)
MONOCYTES # BLD: 0.5 K/UL (ref 0–0.8)
MONOCYTES NFR BLD: 9.8 % (ref 0–10)
NEUTROPHILS # BLD: 3.3 K/UL (ref 1.8–7)
NEUTROPHILS NFR BLD AUTO: 67.9 % (ref 50–75)
NRBC BLD AUTO-RTO: 0.7 % (ref 0–0)
PLATELET # BLD: 224 K/UL (ref 130–400)
PMV BLD AUTO: 10.3 FL (ref 7.2–11.7)
PROTHROMBIN TIME: 47.1 SECONDS (ref 9.8–13.1)
RBC # BLD AUTO: 4.34 MIL/UL (ref 3.8–5.2)
TROPONIN I SERPL-MCNC: 0.03 NG/ML (ref 0–0.12)
WBC # BLD AUTO: 4.9 K/UL (ref 4.8–10.8)

## 2018-02-16 LAB
ALBUMIN SERPL-MCNC: 3.1 G/DL (ref 3.5–5)
ALBUMIN/GLOB SERPL: 1 {RATIO} (ref 1–2.1)
ALT SERPL-CCNC: 58 U/L (ref 9–52)
AST SERPL-CCNC: 48 U/L (ref 14–36)
BUN SERPL-MCNC: 76 MG/DL (ref 7–17)
CALCIUM SERPL-MCNC: 8.3 MG/DL (ref 8.4–10.2)
ERYTHROCYTE [DISTWIDTH] IN BLOOD BY AUTOMATED COUNT: 18.3 % (ref 11.5–14.5)
GFR NON-AFRICAN AMERICAN: 21
HGB BLD-MCNC: 8.1 G/DL (ref 12–16)
MCH RBC QN AUTO: 23.3 PG (ref 27–31)
MCHC RBC AUTO-ENTMCNC: 30.8 G/DL (ref 33–37)
MCV RBC AUTO: 75.5 FL (ref 81–99)
PLATELET # BLD: 165 K/UL (ref 130–400)
RBC # BLD AUTO: 3.46 MIL/UL (ref 3.8–5.2)
WBC # BLD AUTO: 4.4 K/UL (ref 4.8–10.8)

## 2018-02-16 RX ADMIN — PANTOPRAZOLE SODIUM SCH MG: 20 TABLET, DELAYED RELEASE ORAL at 08:47

## 2018-02-16 RX ADMIN — METOPROLOL SUCCINATE SCH MG: 50 TABLET, EXTENDED RELEASE ORAL at 10:53

## 2018-02-16 NOTE — CP.PCM.HP
History of Present Illness





- History of Present Illness


History of Present Illness: 





                                                               this 78-year-old 

female with known coronary artery disease who had required coronary bypass 

graft surgery more than 30 years back and subsequently has had coronary 

stenting and also is being treated for chronic atrial fibrillation with 

congestive cardiac failure and has had an AICD implanted more than 4 years back

, came into the hospital complaining of profound fatigue and extreme loss of 

appetite and was hospitalized. The patient is not a diabetic and was never a 

smoker. She was hospitalized a month and a half back with GI bleed and was 

found to have a bleeding peptic ulcer which has been treated. The patient has 

been on chronic oral anticoagulation  with Xarelto for more than 4 years.


Her last echocardiogram approximately a year and a half back shows severe left 

ventricular systolic dysfunction with an estimated left ventricular ejection 

fraction of around 15% and severe mitral regurgitation. The patient was being 

treated aggressively with diuretics which consisted of furosemide and 

spironolactone and her BUN/creatinine which was mildly elevated in the past 

showed an abrupt rise as well as a sudden drop in her GFR as well as a systolic 

blood pressure in the range of 80-90 mmHg. This was accompanied by a profound 

sense of fatigue. The patient is being treated for anxiety and mild 

depression.the patient was recently started on oral amiodarone in preparation 

to slow her heart rate down and possibly convert her back to sinus rhythm.


In the emergency room she was treated with intravenous infusion of normal 

saline and sent to the intensive care unit where she is being monitored 

overnight.she is alert awake and coherent and is able to breathe comfortably at 

16 breaths per minute and  carry on a conversation. She has received 

approximately 3 L of fluid intravenously since her arrival in the emergency 

room. Her cardiac monitor shows atrial fibrillation with a heart rate of 78 bpm 

with intermittent VVI paced rhythm. Her blood pressure was 98/70 mmHg. Her 

jugular venous pressure was not elevated and there was no edema over her lower 

extremities. The pedal pulses were not palpable. Her extremities were warm her 

nailbeds were pink and her mentation was clear. There was no central or 

peripheral cyanosis. Scar of sternotomy was evident. The apex was in the 6 space

, slightly heaving in character with a muffled first heart sound and an apical 

systolic murmur of mitral regurgitation. There were no rales at the bases. 

Abdomen was soft liver and spleen were not palpable. her electrocardiogram 

showed atrial fibrillation with mostly VVI paced rhythm. Rare native complexes 

did not show any Q waves.


Her chest x-ray showed cardiomegaly with minimal right pleural effusion. There 

was no alveolar congestion.


Her labs demonstrated a hemoglobin of 10 g which dropped to 8.1 g after IV 

hydration. Her platelet count was normal. Her BUN and creatinine which were 75 

and 2.3 mg percent at admission were 76 and 2.2 mg percent this morning. Her 

serum potassium level was 3.2 mg/L. Her AST and ALT which were 50 and 61 units 

at admission were 48 and 58 units this morning. Her PT and INR were 47 seconds 

and 4.1 at admission in the emergency room the patient has never been on 

warfarin. The patient was given 10 mg of vitamin K intramuscularly yesterday 

evening and is given an additional 10 mg this morning. Her troponin level was 

normal her serum total bilirubin was 2.4 mg percent at admission it was 2 mg 

percent this morning





Impression: severe congestive cardiac failure (which is left ventricular, 

chronic and systolic) with acute kidney injury secondary to aggressive 

diuresis. Severe left ventricular systolic dysfunction with status post AICD 

implant. Severe mitral regurgitation. Stable coronary artery disease with 

status post coronary bypass graft surgery. Chronic atrial fibrillation.history 

of recent GI bleed secondary to peptic ulcer disease.





The patient is being cautiously hydrated and her BUN creatinine and 

electrolytes are being monitored. I have given her vitamin K in an attempt to 

correct her elevated INR. The patient has been kept off of diuretics until her 

dehydration and hypovolemia resolves. I have discussed with her 

electrophysiologist present findings.





Present on Admission





- Present on Admission


Any Indicators Present on Admission: No





Past Patient History





- Infectious Disease


Hx of Infectious Diseases: None





- Past Medical History & Family History


Past Medical History?: Yes





- Past Social History


Smoking Status: Former Smoker





- CARDIAC


Hx Cardiac Disorders: Yes


Hx Atrial Fibrillation: Yes


Hx Congestive Heart Failure: Yes


Hx Heart Attack: Yes


Hx Hypercholesterolemia: Yes


Hx Hypertension: Yes


Hx Internal Defibrillator: Yes


Hx Pacemaker: Yes


Hx Peripheral Edema: Yes





- PULMONARY


Hx Respiratory Disorders: No





- NEUROLOGICAL


Hx Neurological Disorder: No





- HEENT


Hx HEENT Problems: No





- RENAL


Hx Chronic Kidney Disease: No





- ENDOCRINE/METABOLIC


Hx Endocrine Disorders: Yes


Hx Hypothyroidism: Yes





- HEMATOLOGICAL/ONCOLOGICAL


Hx Blood Disorders: Yes





- INTEGUMENTARY


Hx Dermatological Problems: No





- MUSCULOSKELETAL/RHEUMATOLOGICAL


Hx Arthritis: Yes


Hx Falls: No


Hx Osteoporosis: Yes





- GASTROINTESTINAL


Hx Gastrointestinal Disorders: Yes


Hx Gastroesophageal Reflux: Yes (HEARTBURN)





- GENITOURINARY/GYNECOLOGICAL


Hx Genitourinary Disorders: No





- PSYCHIATRIC


Hx Anxiety: Yes


Hx Substance Use: No





- SURGICAL HISTORY


Hx Coronary Artery Bypass Graft: Yes (CARDIAC [id number])


Other/Comment: RIGHT BREAST CYSTECTOMY, AV ABLATION





- ANESTHESIA


Hx Anesthesia: Yes


Hx Anesthesia Reactions: No


Hx Malignant Hyperthermia: No





Meds


Allergies/Adverse Reactions: 


 Allergies











Allergy/AdvReac Type Severity Reaction Status Date / Time


 


No Known Allergies Allergy   Verified 04/20/17 10:22














Results





- Vital Signs


Recent Vital Signs: 





 Last Vital Signs











Temp  97.5 F L  02/16/18 08:00


 


Pulse  85   02/16/18 09:00


 


Resp  23   02/16/18 09:00


 


BP  98/60 L  02/16/18 09:00


 


Pulse Ox  95   02/16/18 09:00














- Labs


Result Diagrams: 


 02/16/18 04:40





 02/16/18 04:40


Labs: 





 Laboratory Results - last 24 hr











  02/15/18 02/15/18 02/15/18





  20:10 20:10 20:10


 


WBC  4.9  


 


RBC  4.34  


 


Hgb  10.0 L  


 


Hct  32.9 L  


 


MCV  75.8 L  


 


MCH  23.0 L  


 


MCHC  30.3 L  


 


RDW  18.8 H  


 


Plt Count  224  


 


MPV  10.3  


 


Neut % (Auto)  67.9  


 


Lymph % (Auto)  21.4  


 


Mono % (Auto)  9.8  


 


Eos % (Auto)  0.1  


 


Baso % (Auto)  0.8  


 


Neut # (Auto)  3.3  


 


Lymph # (Auto)  1.0  


 


Mono # (Auto)  0.5  


 


Eos # (Auto)  0.0  


 


Baso # (Auto)  0.0  


 


PT    47.1 H*


 


INR    4.1 H


 


APTT    31.4


 


Sodium   137 


 


Potassium   3.7 


 


Chloride   94 L 


 


Carbon Dioxide   21 L 


 


Anion Gap   26 H 


 


BUN   75 H 


 


Creatinine   2.3 H 


 


Est GFR ( Amer)   25 


 


Est GFR (Non-Af Amer)   20 


 


Random Glucose   106 H 


 


Calcium   9.1 


 


Total Bilirubin   2.4 H 


 


AST   50 H 


 


ALT   61 H 


 


Alkaline Phosphatase   107 


 


Total Creatine Kinase   24 L 


 


Troponin I   0.0250 


 


Total Protein   7.4 


 


Albumin   3.9 


 


Globulin   3.5 


 


Albumin/Globulin Ratio   1.1 


 


Blood Type   


 


Antibody Screen   


 


BBK History Checked   














  02/15/18 02/16/18 02/16/18





  20:10 04:40 04:40


 


WBC   4.4 L 


 


RBC   3.46 L 


 


Hgb   8.1 L 


 


Hct   26.2 L 


 


MCV   75.5 L 


 


MCH   23.3 L 


 


MCHC   30.8 L 


 


RDW   18.3 H 


 


Plt Count   165 


 


MPV   


 


Neut % (Auto)   


 


Lymph % (Auto)   


 


Mono % (Auto)   


 


Eos % (Auto)   


 


Baso % (Auto)   


 


Neut # (Auto)   


 


Lymph # (Auto)   


 


Mono # (Auto)   


 


Eos # (Auto)   


 


Baso # (Auto)   


 


PT   


 


INR   


 


APTT   


 


Sodium    136


 


Potassium    3.2 L


 


Chloride    97 L


 


Carbon Dioxide    23


 


Anion Gap    19


 


BUN    76 H


 


Creatinine    2.2 H


 


Est GFR ( Amer)    26


 


Est GFR (Non-Af Amer)    21


 


Random Glucose    88


 


Calcium    8.3 L


 


Total Bilirubin    2.0 H


 


AST    48 H


 


ALT    58 H


 


Alkaline Phosphatase    82


 


Total Creatine Kinase   


 


Troponin I   


 


Total Protein    6.2 L


 


Albumin    3.1 L D


 


Globulin    3.1


 


Albumin/Globulin Ratio    1.0


 


Blood Type  B POSITIVE  


 


Antibody Screen  Negative  


 


BBK History Checked  No verified bt

## 2018-02-16 NOTE — RAD
HISTORY:

weakness  



COMPARISON:

Chest radiograph dated 10/24/2017. 



FINDINGS:



LUNGS:

Stable chronic prominence of the bilateral interstitial markings.  

Right basilar atelectasis.



PLEURA:

Trace/small right pleural effusion. No pneumothorax apparent.



CARDIOVASCULAR:

Left subclavian access AICD/ pacemaker.  Prior sternotomy with 

sternal wires and surgical clips redemonstrated.  Atherosclerotic 

aortic calcifications.  Cardiomediastinal silhouette stably enlarged.



OSSEOUS STRUCTURES:

Unchanged.



VISUALIZED UPPER ABDOMEN:

Normal.



OTHER FINDINGS:

None.



IMPRESSION:

Stable chronic prominence of the bilateral interstitial markings. No 

focal consolidation. 



Trace/small right pleural effusion.

## 2018-02-17 LAB
ALBUMIN SERPL-MCNC: 2.9 G/DL (ref 3.5–5)
ALBUMIN/GLOB SERPL: 0.9 {RATIO} (ref 1–2.1)
ALT SERPL-CCNC: 65 U/L (ref 9–52)
AST SERPL-CCNC: 53 U/L (ref 14–36)
BUN SERPL-MCNC: 64 MG/DL (ref 7–17)
CALCIUM SERPL-MCNC: 8.2 MG/DL (ref 8.4–10.2)
ERYTHROCYTE [DISTWIDTH] IN BLOOD BY AUTOMATED COUNT: 18.4 % (ref 11.5–14.5)
GFR NON-AFRICAN AMERICAN: 27
HGB BLD-MCNC: 8.8 G/DL (ref 12–16)
INR PPP: 2.7 (ref 0.9–1.2)
MCH RBC QN AUTO: 23.5 PG (ref 27–31)
MCHC RBC AUTO-ENTMCNC: 31.5 G/DL (ref 33–37)
MCV RBC AUTO: 74.7 FL (ref 81–99)
PLATELET # BLD: 178 K/UL (ref 130–400)
PROTHROMBIN TIME: 30.1 SECONDS (ref 9.8–13.1)
RBC # BLD AUTO: 3.72 MIL/UL (ref 3.8–5.2)
WBC # BLD AUTO: 3.9 K/UL (ref 4.8–10.8)

## 2018-02-17 RX ADMIN — POTASSIUM CHLORIDE SCH MEQ: 20 TABLET, EXTENDED RELEASE ORAL at 09:21

## 2018-02-17 RX ADMIN — PANTOPRAZOLE SODIUM SCH MG: 20 TABLET, DELAYED RELEASE ORAL at 09:22

## 2018-02-17 RX ADMIN — METOPROLOL SUCCINATE SCH MG: 50 TABLET, EXTENDED RELEASE ORAL at 09:23

## 2018-02-17 RX ADMIN — POTASSIUM CHLORIDE SCH MEQ: 20 TABLET, EXTENDED RELEASE ORAL at 16:40

## 2018-02-17 NOTE — CARD
--------------- APPROVED REPORT --------------





EKG Measurement

Heart [id number]

[id number]

[id number]

[id number]



<Conclusion>

Ventricular paced rhythm with occasional inherent QRS complexes

Abnormal ECG

## 2018-02-18 LAB
ALBUMIN SERPL-MCNC: 2.7 G/DL (ref 3.5–5)
ALBUMIN/GLOB SERPL: 0.8 {RATIO} (ref 1–2.1)
ALT SERPL-CCNC: 69 U/L (ref 9–52)
AST SERPL-CCNC: 64 U/L (ref 14–36)
BUN SERPL-MCNC: 59 MG/DL (ref 7–17)
CALCIUM SERPL-MCNC: 8.7 MG/DL (ref 8.4–10.2)
ERYTHROCYTE [DISTWIDTH] IN BLOOD BY AUTOMATED COUNT: 18.3 % (ref 11.5–14.5)
GFR NON-AFRICAN AMERICAN: 27
HGB BLD-MCNC: 8.4 G/DL (ref 12–16)
INR PPP: 1.8 (ref 0.9–1.2)
MCH RBC QN AUTO: 23.3 PG (ref 27–31)
MCHC RBC AUTO-ENTMCNC: 31.3 G/DL (ref 33–37)
MCV RBC AUTO: 74.6 FL (ref 81–99)
PLATELET # BLD: 167 K/UL (ref 130–400)
PROTHROMBIN TIME: 19.6 SECONDS (ref 9.8–13.1)
RBC # BLD AUTO: 3.59 MIL/UL (ref 3.8–5.2)
WBC # BLD AUTO: 4.1 K/UL (ref 4.8–10.8)

## 2018-02-18 RX ADMIN — METOPROLOL SUCCINATE SCH MG: 50 TABLET, EXTENDED RELEASE ORAL at 09:27

## 2018-02-18 RX ADMIN — POTASSIUM CHLORIDE SCH MEQ: 20 TABLET, EXTENDED RELEASE ORAL at 16:44

## 2018-02-18 RX ADMIN — POTASSIUM CHLORIDE SCH MEQ: 20 TABLET, EXTENDED RELEASE ORAL at 09:26

## 2018-02-18 RX ADMIN — PANTOPRAZOLE SODIUM SCH MG: 20 TABLET, DELAYED RELEASE ORAL at 09:26

## 2018-02-18 NOTE — CP.PCM.PN
Subjective





- Date & Time of Evaluation


Date of Evaluation: 02/18/18


Time of Evaluation: 12:15





- Subjective


Subjective: 





                                                           Had a BM this AM, 

now sitting OOB


                                                           Denies any dyspnoea


                                                           A Fib (with VVI 

pacing) at 80 BPM


                                                           BP 98/70 mm Hg


                                                          Pulse ox 97 % on N/C


                                                          Resp rate 16-18 BPM, 

can converse without difficulty


                                                          JVP flat (reclining 

in bed)


                                                          No pedal oedema


                                                          No rales


                                                          S1 muffled with MR+


                                                          BUN/Creatinin 59/1.8 

Mg


                                                          K+ normal


                                                          INR 1.8 (from 2.7 

yesterday)


                                                          HB stable


                                                          AST/ALT continue to 

be abnormal (Off of Amiodarone)


                                                          Have continued IV 

fluids(Takes oral fluids as well)


                                                          Awaits bed out of ICU


                                                          Will monitor labs





Objective





- Vital Signs/Intake and Output


Vital Signs (last 24 hours): 


 











Temp Pulse Resp BP Pulse Ox


 


 97.6 F   83   31 H  96/57 L  100 


 


 02/18/18 12:27  02/18/18 12:27  02/18/18 12:27  02/18/18 12:27  02/18/18 12:27








Intake and Output: 


 











 02/18/18 02/18/18





 06:59 18:59


 


Intake Total 600 240


 


Output Total 200 


 


Balance 400 240














- Medications


Medications: 


 Current Medications





Alprazolam (Xanax)  0.25 mg PO [facility]


   Stop: 02/23/18 22:01


   Last Admin: 02/17/18 23:46 Dose:  0.25 mg


Atorvastatin Calcium (Lipitor)  20 mg PO [facility]


   Last Admin: 02/17/18 22:13 Dose:  20 mg


Sodium Chloride (Sodium Chloride 0.9%)  1,000 mls @ 50 mls/hr IV .Q20H [facility]


   Stop: 02/19/18 12:26


Levothyroxine Sodium (Synthroid)  75 mcg PO DAILY@0630 [facility]


   Last Admin: 02/18/18 07:00 Dose:  75 mcg


Metoprolol Succinate (Toprol Xl)  50 mg PO DAILY [facility]


   Last Admin: 02/18/18 09:27 Dose:  50 mg


Pantoprazole Sodium (Protonix Ec Tab)  20 mg PO DAILY [facility]


   Last Admin: 02/18/18 09:26 Dose:  20 mg


Potassium Chloride (K-Dur 20 Meq Er Tab)  20 meq PO BID [facility]


   Last Admin: 02/18/18 09:26 Dose:  20 meq











- Labs


Labs: 


 





 02/18/18 04:00 





 02/18/18 05:28 





 











PT  19.6 Seconds (9.8-13.1)  H D 02/18/18  04:00    


 


INR  1.8  (0.9-1.2)  H D 02/18/18  04:00    


 


APTT  31.4 Seconds (25.6-37.1)   02/15/18  20:10

## 2018-02-19 LAB
ALBUMIN SERPL-MCNC: 2.9 G/DL (ref 3.5–5)
ALBUMIN/GLOB SERPL: 0.9 {RATIO} (ref 1–2.1)
ALT SERPL-CCNC: 72 U/L (ref 9–52)
AST SERPL-CCNC: 56 U/L (ref 14–36)
BUN SERPL-MCNC: 49 MG/DL (ref 7–17)
CALCIUM SERPL-MCNC: 8.6 MG/DL (ref 8.4–10.2)
ERYTHROCYTE [DISTWIDTH] IN BLOOD BY AUTOMATED COUNT: 18.2 % (ref 11.5–14.5)
GFR NON-AFRICAN AMERICAN: 29
HGB BLD-MCNC: 8.4 G/DL (ref 12–16)
INR PPP: 1.6 (ref 0.9–1.2)
MCH RBC QN AUTO: 22.7 PG (ref 27–31)
MCHC RBC AUTO-ENTMCNC: 30.3 G/DL (ref 33–37)
MCV RBC AUTO: 75.1 FL (ref 81–99)
PLATELET # BLD: 158 K/UL (ref 130–400)
PROTHROMBIN TIME: 17.4 SECONDS (ref 9.8–13.1)
RBC # BLD AUTO: 3.68 MIL/UL (ref 3.8–5.2)
WBC # BLD AUTO: 4.4 K/UL (ref 4.8–10.8)

## 2018-02-19 RX ADMIN — POTASSIUM CHLORIDE SCH MEQ: 20 TABLET, EXTENDED RELEASE ORAL at 08:25

## 2018-02-19 RX ADMIN — METOPROLOL SUCCINATE SCH MG: 50 TABLET, EXTENDED RELEASE ORAL at 08:26

## 2018-02-19 RX ADMIN — PANTOPRAZOLE SODIUM SCH MG: 20 TABLET, DELAYED RELEASE ORAL at 08:26

## 2018-02-19 NOTE — CP.PCM.PN
Subjective





- Date & Time of Evaluation


Date of Evaluation: 02/19/18


Time of Evaluation: 09:00





- Subjective


Subjective: 





                                          Sitting OOB, appears comfortable


                                          No dyspnoea, no orthopnoea


                                          A Fib at 90 BPM, /64 mm Hg


                                          JVP flat, No pedal oedema


                                          few basal rales+


                                          Faint S3 gallop+


                                          Labs: BUN/Creatinin 49/1.7 mg,  GFR 

29ml/min


                                                  Electrolytes normal


                                                  INR down to 1.6


                                                  Liver profile: Enzymes still 

elevated


                                                  Hb stable at 8.4 Gms (

microcytic)


                                         Will include Iron studies with 

tomorrows labs


                                         Will check for OB in stool





Objective





- Vital Signs/Intake and Output


Vital Signs (last 24 hours): 


 











Temp Pulse Resp BP Pulse Ox


 


 98.0 F   89   17   100/60   100 


 


 02/19/18 08:00  02/19/18 08:26  02/19/18 08:00  02/19/18 08:26  02/19/18 08:00











- Medications


Medications: 


 Current Medications





Alprazolam (Xanax)  0.25 mg PO HS [facility]


   Stop: 02/23/18 22:01


   Last Admin: 02/18/18 23:18 Dose:  0.25 mg


Atorvastatin Calcium (Lipitor)  20 mg PO HS [facility]


   Last Admin: 02/18/18 23:17 Dose:  20 mg


Sodium Chloride (Sodium Chloride 0.9%)  1,000 mls @ 50 mls/hr IV .Q20H [facility]


   Stop: 02/19/18 12:26


   Last Admin: 02/18/18 12:29 Dose:  50 mls/hr


Levothyroxine Sodium (Synthroid)  75 mcg PO DAILY@0630 [facility]


   Last Admin: 02/19/18 06:25 Dose:  75 mcg


Metoprolol Succinate (Toprol Xl)  50 mg PO DAILY [facility]


   Last Admin: 02/19/18 08:26 Dose:  50 mg


Pantoprazole Sodium (Protonix Ec Tab)  20 mg PO DAILY [facility]


   Last Admin: 02/19/18 08:26 Dose:  20 mg


Potassium Chloride (K-Dur 20 Meq Er Tab)  20 meq PO BID [facility]


   Last Admin: 02/19/18 08:25 Dose:  20 meq











- Labs


Labs: 


 





 02/19/18 04:20 





 02/19/18 04:20 





 











PT  17.4 Seconds (9.8-13.1)  H  02/19/18  04:20    


 


INR  1.6  (0.9-1.2)  H  02/19/18  04:20    


 


APTT  31.4 Seconds (25.6-37.1)   02/15/18  20:10

## 2018-02-20 LAB
% IRON SATURATION: 4 % (ref 20–55)
ALBUMIN SERPL-MCNC: 2.9 G/DL (ref 3.5–5)
ALBUMIN/GLOB SERPL: 0.9 {RATIO} (ref 1–2.1)
ALT SERPL-CCNC: 68 U/L (ref 9–52)
AST SERPL-CCNC: 61 U/L (ref 14–36)
BUN SERPL-MCNC: 45 MG/DL (ref 7–17)
CALCIUM SERPL-MCNC: 8.8 MG/DL (ref 8.4–10.2)
ERYTHROCYTE [DISTWIDTH] IN BLOOD BY AUTOMATED COUNT: 18.6 % (ref 11.5–14.5)
GFR NON-AFRICAN AMERICAN: 31
HGB BLD-MCNC: 8.3 G/DL (ref 12–16)
INR PPP: 1.5 (ref 0.9–1.2)
IRON SERPL-MCNC: 12 UG/DL (ref 37–170)
MCH RBC QN AUTO: 22.6 PG (ref 27–31)
MCHC RBC AUTO-ENTMCNC: 30.2 G/DL (ref 33–37)
MCV RBC AUTO: 74.8 FL (ref 81–99)
PLATELET # BLD: 134 K/UL (ref 130–400)
PROTHROMBIN TIME: 16.2 SECONDS (ref 9.8–13.1)
RBC # BLD AUTO: 3.69 MIL/UL (ref 3.8–5.2)
TIBC SERPL-MCNC: 339 UG/DL (ref 250–450)
WBC # BLD AUTO: 4.8 K/UL (ref 4.8–10.8)

## 2018-02-20 RX ADMIN — PANTOPRAZOLE SODIUM SCH MG: 20 TABLET, DELAYED RELEASE ORAL at 08:15

## 2018-02-20 NOTE — CP.PCM.PN
Subjective





- Date & Time of Evaluation


Date of Evaluation: 02/20/18


Time of Evaluation: 08:50





- Subjective


Subjective: 





                                                Sitting OOB C/O anorexia


                                                A Fib at 80 BPM


                                                /70 mm Hg


                                                JVP flat, no oedema over feet


                                                Azotemia continues to resolve, K

+ normal


                                                INR continues to normalise


                                                LIVER profile continues to be 

abnormal (Amiodarone was D/[place] 3 days back)


                                                Have requested GI evaluation 

with Dr. Bone


                                                Have requested CT abd/pelvis 

with Oral contrast (no IV contract due to yash BUN/Creatinin)


                                                Iron studies show low Fe stores 

and low Fe saturation


                                                Have arranged IV Fe infusion


                                                Stool OB awaited





Objective





- Vital Signs/Intake and Output


Vital Signs (last 24 hours): 


 











Temp Pulse Resp BP Pulse Ox


 


 97.9 F   111 H  24   98/67 L  100 


 


 02/20/18 08:00  02/20/18 08:15  02/20/18 08:00  02/20/18 08:15  02/20/18 08:00








Intake and Output: 


 











 02/20/18 02/20/18





 06:59 18:59


 


Intake Total 100 


 


Balance 100 














- Medications


Medications: 


 Current Medications





Alprazolam (Xanax)  0.25 mg PO HS PRN


   PRN Reason: Sleep


   Stop: 02/26/18 23:34


   Last Admin: 02/19/18 23:41 Dose:  0.25 mg


Atorvastatin Calcium (Lipitor)  20 mg PO [facility]


   Last Admin: 02/19/18 21:53 Dose:  20 mg


Levothyroxine Sodium (Synthroid)  75 mcg PO DAILY@0630 [facility], [facility]


   Last Admin: 02/20/18 06:35 Dose:  75 mcg


Metoprolol Succinate (Toprol Xl)  100 mg PO DAILY [facility]


   Last Admin: 02/20/18 08:15 Dose:  100 mg


Pantoprazole Sodium (Protonix Ec Tab)  20 mg PO DAILY [facility]


   Last Admin: 02/20/18 08:15 Dose:  20 mg











- Labs


Labs: 


 





 02/20/18 04:40 





 02/20/18 04:40 





 











PT  16.2 Seconds (9.8-13.1)  H  02/20/18  04:40    


 


INR  1.5  (0.9-1.2)  H  02/20/18  04:40    


 


APTT  31.4 Seconds (25.6-37.1)   02/15/18  20:10

## 2018-02-20 NOTE — CT
PROCEDURE:  CT Abdomen and Pelvis without intravenous contrast



HISTORY:

Abnormal Liver profile



COMPARISON:

None.



TECHNIQUE:

Without contrast.. 



Contrast Dose: 0



Radiation dose:



Total exam DLP = 757.87 mGy-cm.



This CT exam was performed using one or more of the following dose 

reduction techniques: Automated exposure control, adjustment of the 

mA and/or kV according to patient size, and/or use of iterative 

reconstruction technique.



FINDINGS:



LOWER THORAX:

Small bilateral pleural effusion. Minimal bilateral lower lobe 

compressive atelectasis.  Cardiomegaly.  CABG.  AICD. 



LIVER:

Normal size, contour and attenuation. No mass.  Several punctate 

calcifications likely representing calcified old granulomata.  No 

biliary dilatation.  



GALLBLADDER AND BILE DUCTS:

High attenuation bile, nonspecific. No calcified gallstones.  Mild 

nonspecific mural thickening noted. 



PANCREAS:

Unremarkable. No gross lesion or ductal dilatation.



SPLEEN:

Unremarkable. 



ADRENALS:

Unremarkable. No mass. 



KIDNEYS AND URETERS:

Unremarkable. No hydronephrosis. No solid mass. 



VASCULATURE:

Unremarkable. No aortic aneurysm. 



BOWEL:

Unremarkable. No obstruction. No gross mural thickening. 



APPENDIX:

Unremarkable. Normal appendix. 



PERITONEUM:

Mild ascites. 



LYMPH NODES:

Unremarkable. No enlarged lymph nodes. 



BLADDER:

Poorly distended.  Grossly unremarkable. 



REPRODUCTIVE:

Normal atrophic postmenopausal uterus. 



BONES:

Sclerosis about the SI joints bilaterally, right greater than left. 

Grade 1 anterolisthesis L4-5 without spondylolysis. 



OTHER FINDINGS:

None.



IMPRESSION:

Mild ascites.  Small bilateral pleural effusion.  Cardiomegaly, CABG 

and AICD. No evidence of biliary obstruction.  Mild mural thickening 

of the gallbladder, nonspecific. Additional minor findings as above.

## 2018-02-20 NOTE — CP.PCM.CON
History of Present Illness





- History of Present Illness


History of Present Illness: 





Pt is 78 year old female with CAD CHF HFLEF Afib Hypoyhyroidism and bleeding 

PUD admitted with exacerbation of CHF and ARF secondary to aggressive diuresis. 

Pt is referred for evaluation of postprandial epigastric discomfort fullness 

and decrease po intake. Pt denies hematemesis melena or change in bowel habits, 

no loss of weight or recurrent nausea and vomiting since admission





Review of Systems





- Gastrointestinal


Gastrointestinal: As Per HPI





- Genitourinary


Genitourinary: As Per HPI





Past Patient History





- Infectious Disease


Hx of Infectious Diseases: None





- Past Medical History & Family History


Past Medical History?: Yes





- Past Social History


Smoking Status: Former Smoker





- CARDIAC


Hx Cardiac Disorders: Yes


Hx Atrial Fibrillation: Yes


Hx Congestive Heart Failure: Yes


Hx Heart Attack: Yes


Hx Hypercholesterolemia: Yes


Hx Hypertension: Yes


Hx Internal Defibrillator: Yes


Hx Pacemaker: Yes


Hx Peripheral Edema: Yes





- PULMONARY


Hx Respiratory Disorders: No





- NEUROLOGICAL


Hx Neurological Disorder: No





- HEENT


Hx HEENT Problems: No





- RENAL


Hx Chronic Kidney Disease: No





- ENDOCRINE/METABOLIC


Hx Endocrine Disorders: Yes


Hx Hypothyroidism: Yes





- HEMATOLOGICAL/ONCOLOGICAL


Hx Blood Disorders: Yes





- INTEGUMENTARY


Hx Dermatological Problems: No





- MUSCULOSKELETAL/RHEUMATOLOGICAL


Hx Arthritis: Yes


Hx Falls: No


Hx Osteoporosis: Yes





- GASTROINTESTINAL


Hx Gastrointestinal Disorders: Yes


Hx Gastroesophageal Reflux: Yes (HEARTBURN)





- GENITOURINARY/GYNECOLOGICAL


Hx Genitourinary Disorders: No





- PSYCHIATRIC


Hx Anxiety: Yes


Hx Substance Use: No





- SURGICAL HISTORY


Hx Coronary Artery Bypass Graft: Yes (CARDIAC [id number])


Other/Comment: RIGHT BREAST CYSTECTOMY, AV ABLATION





- ANESTHESIA


Hx Anesthesia: Yes


Hx Anesthesia Reactions: No


Hx Malignant Hyperthermia: No





Meds


Allergies/Adverse Reactions: 


 Allergies











Allergy/AdvReac Type Severity Reaction Status Date / Time


 


No Known Allergies Allergy   Verified 04/20/17 10:22














- Medications


Medications: 


 Current Medications





Alprazolam (Xanax)  0.25 mg PO HS PRN


   PRN Reason: Sleep


   Stop: 02/26/18 23:34


   Last Admin: 02/19/18 23:41 Dose:  0.25 mg


Atorvastatin Calcium (Lipitor)  20 mg PO HS [facility]


   Last Admin: 02/19/18 21:53 Dose:  20 mg


Iron Sucrose 200 mg/ Sodium (Chloride)  110 mls @ 110 mls/hr IVPB DAILY [facility]


   Last Admin: 02/20/18 12:24 Dose:  110 mls/hr


Levothyroxine Sodium (Synthroid)  75 mcg PO DAILY@0630 [facility]


   Last Admin: 02/20/18 06:35 Dose:  75 mcg


Metoprolol Succinate (Toprol Xl)  100 mg PO DAILY [facility]


   Last Admin: 02/20/18 08:15 Dose:  100 mg


Pantoprazole Sodium (Protonix Ec Tab)  20 mg PO DAILY [facility]


   Last Admin: 02/20/18 08:15 Dose:  20 mg











Physical Exam





- Constitutional


Appears: No Acute Distress





- Head Exam


Head Exam: ATRAUMATIC, NORMAL INSPECTION, NORMOCEPHALIC





- Eye Exam


Eye Exam: EOMI, Normal appearance, PERRL





- Neck Exam


Neck exam: Positive for: Normal Inspection





- Respiratory Exam


Respiratory Exam: Clear to Auscultation Bilateral





- Cardiovascular Exam


Cardiovascular Exam: Irregular Rhythm, Systolic Murmur





- GI/Abdominal Exam


GI & Abdominal Exam: Normal Bowel Sounds, Soft.  absent: Tenderness





- Rectal Exam


Rectal Exam: Deferred





- Extremities Exam


Extremities exam: Positive for: normal inspection





- Back Exam


Back exam: NORMAL INSPECTION





- Neurological Exam


Neurological exam: Alert, CN II-XII Intact, Normal Gait, Oriented x3, Reflexes 

Normal





- Skin


Skin Exam: Dry, Intact, Normal Color, Warm





Results





- Vital Signs


Recent Vital Signs: 


 Last Vital Signs











Temp  97.0 F L  02/20/18 16:00


 


Pulse  106 H  02/20/18 16:00


 


Resp  18   02/20/18 16:00


 


BP  93/61 L  02/20/18 16:00


 


Pulse Ox  100   02/20/18 16:00














- Labs


Result Diagrams: 


 02/21/18 04:50





 02/21/18 04:50


Labs: 


 Laboratory Results - last 24 hr











  02/20/18 02/20/18 02/20/18





  04:40 04:40 04:40


 


WBC   4.8 


 


RBC   3.69 L 


 


Hgb   8.3 L 


 


Hct   27.6 L 


 


MCV   74.8 L 


 


MCH   22.6 L 


 


MCHC   30.2 L 


 


RDW   18.6 H 


 


Plt Count   134 


 


PT    16.2 H


 


INR    1.5 H


 


Sodium  139  


 


Potassium  4.7  


 


Chloride  105  


 


Carbon Dioxide  22  


 


Anion Gap  17  


 


BUN  45 H  


 


Creatinine  1.6 H  


 


Est GFR ( Amer)  38  


 


Est GFR (Non-Af Amer)  31  


 


Random Glucose  91  


 


Calcium  8.8  


 


Iron   


 


TIBC   


 


% Saturation   


 


Ferritin   


 


Total Bilirubin  2.1 H  


 


AST  61 H  


 


ALT  68 H  


 


Alkaline Phosphatase  98  


 


Total Protein  6.2 L  


 


Albumin  2.9 L  


 


Globulin  3.3  


 


Albumin/Globulin Ratio  0.9 L  














  02/20/18 02/20/18





  07:30 07:30


 


WBC  


 


RBC  


 


Hgb  


 


Hct  


 


MCV  


 


MCH  


 


MCHC  


 


RDW  


 


Plt Count  


 


PT  


 


INR  


 


Sodium  


 


Potassium  


 


Chloride  


 


Carbon Dioxide  


 


Anion Gap  


 


BUN  


 


Creatinine  


 


Est GFR ( Amer)  


 


Est GFR (Non-Af Amer)  


 


Random Glucose  


 


Calcium  


 


Iron  12 L 


 


TIBC  339 


 


% Saturation  4 L 


 


Ferritin   25.8


 


Total Bilirubin  


 


AST  


 


ALT  


 


Alkaline Phosphatase  


 


Total Protein  


 


Albumin  


 


Globulin  


 


Albumin/Globulin Ratio  














Assessment & Plan


(1) Acute renal failure


Status: Acute   





(2) Anemia


Status: Acute   





(3) Dehydration


Status: Acute   





(4) CHF (congestive heart failure)


Status: Acute   





(5) Pedal edema


Status: Acute   





- Assessment and Plan (Free Text)


Assessment: 





78 year old female with HFLEF Afib ARF Congestive Hepatis and Dyspepsia 

secondary to PUD


Continue Pentoprazole


Monitor LFT


EGD to assure healing OF PUD when medically stable

## 2018-02-21 LAB
ALBUMIN SERPL-MCNC: 2.7 G/DL (ref 3.5–5)
ALBUMIN/GLOB SERPL: 0.9 {RATIO} (ref 1–2.1)
ALT SERPL-CCNC: 69 U/L (ref 9–52)
AST SERPL-CCNC: 85 U/L (ref 14–36)
BUN SERPL-MCNC: 44 MG/DL (ref 7–17)
CALCIUM SERPL-MCNC: 8.6 MG/DL (ref 8.4–10.2)
ERYTHROCYTE [DISTWIDTH] IN BLOOD BY AUTOMATED COUNT: 18.2 % (ref 11.5–14.5)
GFR NON-AFRICAN AMERICAN: 29
HGB BLD-MCNC: 7.9 G/DL (ref 12–16)
INR PPP: 1.5 (ref 0.9–1.2)
MCH RBC QN AUTO: 23.4 PG (ref 27–31)
MCHC RBC AUTO-ENTMCNC: 31.5 G/DL (ref 33–37)
MCV RBC AUTO: 74.3 FL (ref 81–99)
PLATELET # BLD: 120 K/UL (ref 130–400)
PROTHROMBIN TIME: 16.9 SECONDS (ref 9.8–13.1)
RBC # BLD AUTO: 3.36 MIL/UL (ref 3.8–5.2)
WBC # BLD AUTO: 4.6 K/UL (ref 4.8–10.8)

## 2018-02-21 RX ADMIN — PANTOPRAZOLE SODIUM SCH MG: 20 TABLET, DELAYED RELEASE ORAL at 09:23

## 2018-02-21 NOTE — CP.PCM.CON
History of Present Illness





- History of Present Illness


History of Present Illness: 





                                              Sitting OOB, ate half of her 

breakfast


                                              Slept well


                                              A Fib at  BPM


                                              /70 mm Hg


                                              JVP flat, no oedema over feet


                                              Labs show Creatinin 1.7 mg%


                                              Electrolytes normal


                                              INR 1.6 (will restart Xarelto (at 

15 mg daily)


                                              CT abd: no liver masses


                                              Await GI eval


                                             Getting IV Fe


                                             Pt to start PT


                                             Stool for OB (results awaited)





Past Patient History





- Infectious Disease


Hx of Infectious Diseases: None





- Past Medical History & Family History


Past Medical History?: Yes





- Past Social History


Smoking Status: Former Smoker





- CARDIAC


Hx Cardiac Disorders: Yes


Hx Atrial Fibrillation: Yes


Hx Congestive Heart Failure: Yes


Hx Heart Attack: Yes


Hx Hypercholesterolemia: Yes


Hx Hypertension: Yes


Hx Internal Defibrillator: Yes


Hx Pacemaker: Yes


Hx Peripheral Edema: Yes





- PULMONARY


Hx Respiratory Disorders: No





- NEUROLOGICAL


Hx Neurological Disorder: No





- HEENT


Hx HEENT Problems: No





- RENAL


Hx Chronic Kidney Disease: No





- ENDOCRINE/METABOLIC


Hx Endocrine Disorders: Yes


Hx Hypothyroidism: Yes





- HEMATOLOGICAL/ONCOLOGICAL


Hx Blood Disorders: Yes





- INTEGUMENTARY


Hx Dermatological Problems: No





- MUSCULOSKELETAL/RHEUMATOLOGICAL


Hx Arthritis: Yes


Hx Falls: No


Hx Osteoporosis: Yes





- GASTROINTESTINAL


Hx Gastrointestinal Disorders: Yes


Hx Gastroesophageal Reflux: Yes (HEARTBURN)





- GENITOURINARY/GYNECOLOGICAL


Hx Genitourinary Disorders: No





- PSYCHIATRIC


Hx Anxiety: Yes


Hx Substance Use: No





- SURGICAL HISTORY


Hx Coronary Artery Bypass Graft: Yes (CARDIAC [id number])


Other/Comment: RIGHT BREAST CYSTECTOMY, AV ABLATION





- ANESTHESIA


Hx Anesthesia: Yes


Hx Anesthesia Reactions: No


Hx Malignant Hyperthermia: No





Meds


Allergies/Adverse Reactions: 


 Allergies











Allergy/AdvReac Type Severity Reaction Status Date / Time


 


No Known Allergies Allergy   Verified 04/20/17 10:22














- Medications


Medications: 


 Current Medications





Alprazolam (Xanax)  0.25 mg PO HS PRN


   PRN Reason: Sleep


   Stop: 02/26/18 23:34


   Last Admin: 02/19/18 23:41 Dose:  0.25 mg


Atorvastatin Calcium (Lipitor)  20 mg PO HS [facility]


   Last Admin: 02/20/18 21:11 Dose:  20 mg


Iron Sucrose 200 mg/ Sodium (Chloride)  110 mls @ 110 mls/hr IVPB DAILY [facility]


   Last Admin: 02/20/18 12:24 Dose:  110 mls/hr


Levothyroxine Sodium (Synthroid)  75 mcg PO DAILY@0630 [facility], [facility]


   Last Admin: 02/21/18 06:03 Dose:  75 mcg


Metoprolol Succinate (Toprol Xl)  100 mg PO DAILY [facility]


   Last Admin: 02/20/18 08:15 Dose:  100 mg


Pantoprazole Sodium (Protonix Ec Tab)  20 mg PO DAILY [facility]


   Last Admin: 02/20/18 08:15 Dose:  20 mg











Results





- Vital Signs


Recent Vital Signs: 


 Last Vital Signs











Temp  97.9 F   02/21/18 08:00


 


Pulse  97 H  02/21/18 08:00


 


Resp  35 H  02/21/18 08:00


 


BP  100/57 L  02/21/18 08:00


 


Pulse Ox  97   02/21/18 08:00














- Labs


Result Diagrams: 


 02/21/18 04:50





 02/21/18 04:50


Labs: 


 Laboratory Results - last 24 hr











  02/21/18 02/21/18 02/21/18





  04:50 04:50 04:50


 


WBC    4.6 L


 


RBC    3.36 L


 


Hgb    7.9 L


 


Hct    25.0 L


 


MCV    74.3 L


 


MCH    23.4 L


 


MCHC    31.5 L


 


RDW    18.2 H


 


Plt Count    120 L


 


PT   16.9 H 


 


INR   1.5 H 


 


Sodium  138  


 


Potassium  4.4  


 


Chloride  104  


 


Carbon Dioxide  23  


 


Anion Gap  15  


 


BUN  44 H  


 


Creatinine  1.7 H  


 


Est GFR ( Amer)  35  


 


Est GFR (Non-Af Amer)  29  


 


Random Glucose  76  


 


Calcium  8.6  


 


Total Bilirubin  2.3 H  


 


AST  85 H D  


 


ALT  69 H  


 


Alkaline Phosphatase  104  


 


Total Protein  5.8 L  


 


Albumin  2.7 L  


 


Globulin  3.1  


 


Albumin/Globulin Ratio  0.9 L

## 2018-02-21 NOTE — CP.PCM.PN
Subjective





- Date & Time of Evaluation


Date of Evaluation: 02/21/18


Time of Evaluation: 11:00





- Subjective


Subjective: 





PT doing better c/o postprandial fullness epigastric discomfort 





Objective





- Vital Signs/Intake and Output


Vital Signs (last 24 hours): 


 











Temp Pulse Resp BP Pulse Ox


 


 98.0 F   112 H  18   95/59 L  99 


 


 02/21/18 19:59  02/21/18 19:59  02/21/18 19:59  02/21/18 19:59  02/21/18 19:59








Intake and Output: 


 











 02/21/18 02/22/18





 18:59 06:59


 


Intake Total 1100 


 


Balance 1100 














- Medications


Medications: 


 Current Medications





Alprazolam (Xanax)  0.25 mg PO HS PRN


   PRN Reason: Sleep


   Stop: 02/26/18 23:34


   Last Admin: 02/19/18 23:41 Dose:  0.25 mg


Atorvastatin Calcium (Lipitor)  20 mg PO HS [facility]


   Last Admin: 02/20/18 21:11 Dose:  20 mg


Iron Sucrose 200 mg/ Sodium (Chloride)  110 mls @ 110 mls/hr IVPB DAILY [facility]


   Last Admin: 02/21/18 11:59 Dose:  110 mls/hr


Levothyroxine Sodium (Synthroid)  75 mcg PO DAILY@0630 [facility]


   Last Admin: 02/21/18 06:03 Dose:  75 mcg


Metoprolol Tartrate (Lopressor)  100 mg PO Q12 [facility]


   Last Admin: 02/21/18 12:00 Dose:  Not Given


Pantoprazole Sodium (Protonix Ec Tab)  20 mg PO DAILY [facility]


   Last Admin: 02/21/18 09:23 Dose:  20 mg


Rivaroxaban (Xarelto)  15 mg PO QD5 [facility]


   PRN Reason: Protocol


   Last Admin: 02/21/18 17:38 Dose:  15 mg











- Labs


Labs: 


 





 02/21/18 04:50 





 02/21/18 04:50 





 











PT  16.9 Seconds (9.8-13.1)  H  02/21/18  04:50    


 


INR  1.5  (0.9-1.2)  H  02/21/18  04:50    


 


APTT  31.4 Seconds (25.6-37.1)   02/15/18  20:10    














- Constitutional


Appears: Well





- Head Exam


Head Exam: ATRAUMATIC, NORMAL INSPECTION, NORMOCEPHALIC





- Respiratory Exam


Respiratory Exam: Clear to Ausculation Bilateral, NORMAL BREATHING PATTERN





- Cardiovascular Exam


Cardiovascular Exam: Irregular Rhythm, Murmur





- GI/Abdominal Exam


GI & Abdominal Exam: Soft, Normal Bowel Sounds.  absent: Tenderness





Assessment and Plan


(1) Acute renal failure


Status: Acute   





(2) Anemia


Status: Acute   





(3) Dehydration


Status: Acute   





(4) CHF (congestive heart failure)


Status: Acute   





(5) Pedal edema


Status: Acute   





(6) Dyspepsia


Assessment & Plan: 


Secondary to History of Known PUD, continue PPI


EGD to assure healing of PUD when medically stable


Change diet to mechanically soft in view of oropharyngeal Dysphagia secondary 

to deconditioning.


Status: Acute   





(7) Abnormal LFTs


Assessment & Plan: 


Most likely secondary to congestive Hepatitis, Hepatitis B and C serology 

ordered and pending


Status: Acute

## 2018-02-22 LAB
ALBUMIN SERPL-MCNC: 2.7 G/DL (ref 3.5–5)
ALBUMIN/GLOB SERPL: 0.8 {RATIO} (ref 1–2.1)
ALT SERPL-CCNC: 72 U/L (ref 9–52)
AST SERPL-CCNC: 71 U/L (ref 14–36)
BUN SERPL-MCNC: 34 MG/DL (ref 7–17)
CALCIUM SERPL-MCNC: 8.6 MG/DL (ref 8.4–10.2)
ERYTHROCYTE [DISTWIDTH] IN BLOOD BY AUTOMATED COUNT: 18.6 % (ref 11.5–14.5)
GFR NON-AFRICAN AMERICAN: 31
HEPATITIS B CORE AB: NEGATIVE
HEPATITIS C ANTIBODY: NEGATIVE
HGB BLD-MCNC: 8 G/DL (ref 12–16)
INR PPP: 4 (ref 0.9–1.2)
MCH RBC QN AUTO: 23.2 PG (ref 27–31)
MCHC RBC AUTO-ENTMCNC: 31.4 G/DL (ref 33–37)
MCV RBC AUTO: 73.8 FL (ref 81–99)
PLATELET # BLD: 110 K/UL (ref 130–400)
PROTHROMBIN TIME: 45.7 SECONDS (ref 9.8–13.1)
RBC # BLD AUTO: 3.44 MIL/UL (ref 3.8–5.2)
WBC # BLD AUTO: 4.2 K/UL (ref 4.8–10.8)

## 2018-02-22 RX ADMIN — PANTOPRAZOLE SODIUM SCH MG: 20 TABLET, DELAYED RELEASE ORAL at 09:17

## 2018-02-22 NOTE — CP.PCM.PN
Subjective





- Date & Time of Evaluation


Date of Evaluation: 02/22/18


Time of Evaluation: 09:00





- Subjective


Subjective: 





                                                               Pt out of ICU


                                                               Continues to 

have severe fatigue and anorexia


                                                               A Fib at 88 BPM, 

/70 mm Hg


                                                               JVP flat, few 

rales at bases+


                                                               S3 gallop 

faintly audible


                                                                Bun trending 

down (34 mg today, with GFR31 ml/mn)


                                                               INR shot up to 

4.0 (having responded to Vit K before)


                                                               Will withold 

Xarelto till INR resolves


                                                               Dr Bone's 

note appreciated


                                                               Spoke with EP 

yesterday


                                                               When she is 

stable will arrange Device replacement and A:V node ablation


                                                               Pt startd 

physical therapy yesterday








Objective





- Vital Signs/Intake and Output


Vital Signs (last 24 hours): 


 











Temp Pulse Resp BP Pulse Ox


 


 97.6 F   94 H  18   104/65   97 


 


 02/22/18 08:02  02/22/18 09:15  02/22/18 08:02  02/22/18 09:15  02/22/18 08:02











- Medications


Medications: 


 Current Medications





Alprazolam (Xanax)  0.25 mg PO HS PRN


   PRN Reason: Sleep


   Stop: 02/26/18 23:34


   Last Admin: 02/21/18 23:54 Dose:  0.25 mg


Atorvastatin Calcium (Lipitor)  20 mg PO HS [facility]


   Last Admin: 02/21/18 21:46 Dose:  20 mg


Iron Sucrose 200 mg/ Sodium (Chloride)  110 mls @ 110 mls/hr IVPB DAILY [facility]


   Last Admin: 02/21/18 11:59 Dose:  110 mls/hr


Levothyroxine Sodium (Synthroid)  75 mcg PO DAILY@0630 [facility]


   Last Admin: 02/22/18 07:10 Dose:  75 mcg


Metoprolol Tartrate (Lopressor)  100 mg PO Q12 [facility]


   Last Admin: 02/22/18 09:15 Dose:  100 mg


Pantoprazole Sodium (Protonix Ec Tab)  20 mg PO DAILY [facility]


   Last Admin: 02/22/18 09:17 Dose:  20 mg











- Labs


Labs: 


 





 02/22/18 05:30 





 02/22/18 05:30 





 











PT  45.7 Seconds (9.8-13.1)  H*  02/22/18  05:30    


 


INR  4.0  (0.9-1.2)  H D 02/22/18  05:30    


 


APTT  31.4 Seconds (25.6-37.1)   02/15/18  20:10

## 2018-02-23 ENCOUNTER — HOSPITAL ENCOUNTER (INPATIENT)
Dept: HOSPITAL 14 - H.TCU | Age: 78
LOS: 5 days | Discharge: ANOTHER HOSPITAL | DRG: 292 | End: 2018-02-28
Attending: INTERNAL MEDICINE | Admitting: INTERNAL MEDICINE
Payer: COMMERCIAL

## 2018-02-23 VITALS
DIASTOLIC BLOOD PRESSURE: 59 MMHG | SYSTOLIC BLOOD PRESSURE: 90 MMHG | RESPIRATION RATE: 20 BRPM | OXYGEN SATURATION: 96 % | HEART RATE: 77 BPM | TEMPERATURE: 97.5 F

## 2018-02-23 VITALS — BODY MASS INDEX: 24.5 KG/M2

## 2018-02-23 DIAGNOSIS — E03.9: ICD-10-CM

## 2018-02-23 DIAGNOSIS — Z95.810: ICD-10-CM

## 2018-02-23 DIAGNOSIS — M19.90: ICD-10-CM

## 2018-02-23 DIAGNOSIS — I25.2: ICD-10-CM

## 2018-02-23 DIAGNOSIS — K21.9: ICD-10-CM

## 2018-02-23 DIAGNOSIS — I34.0: ICD-10-CM

## 2018-02-23 DIAGNOSIS — I48.2: ICD-10-CM

## 2018-02-23 DIAGNOSIS — Z79.01: ICD-10-CM

## 2018-02-23 DIAGNOSIS — D50.9: ICD-10-CM

## 2018-02-23 DIAGNOSIS — E78.5: ICD-10-CM

## 2018-02-23 DIAGNOSIS — E78.00: ICD-10-CM

## 2018-02-23 DIAGNOSIS — K27.9: ICD-10-CM

## 2018-02-23 DIAGNOSIS — F41.9: ICD-10-CM

## 2018-02-23 DIAGNOSIS — I50.22: ICD-10-CM

## 2018-02-23 DIAGNOSIS — Z95.1: ICD-10-CM

## 2018-02-23 DIAGNOSIS — N17.9: ICD-10-CM

## 2018-02-23 DIAGNOSIS — D68.4: ICD-10-CM

## 2018-02-23 DIAGNOSIS — M81.0: ICD-10-CM

## 2018-02-23 DIAGNOSIS — Z95.5: ICD-10-CM

## 2018-02-23 DIAGNOSIS — Z87.11: ICD-10-CM

## 2018-02-23 DIAGNOSIS — R00.0: ICD-10-CM

## 2018-02-23 DIAGNOSIS — I25.10: ICD-10-CM

## 2018-02-23 DIAGNOSIS — I13.0: Primary | ICD-10-CM

## 2018-02-23 DIAGNOSIS — T46.2X5A: ICD-10-CM

## 2018-02-23 DIAGNOSIS — R79.89: ICD-10-CM

## 2018-02-23 DIAGNOSIS — I95.9: ICD-10-CM

## 2018-02-23 DIAGNOSIS — Z87.891: ICD-10-CM

## 2018-02-23 DIAGNOSIS — N18.3: ICD-10-CM

## 2018-02-23 LAB
ALBUMIN SERPL-MCNC: 2.8 G/DL (ref 3.5–5)
ALBUMIN/GLOB SERPL: 0.8 {RATIO} (ref 1–2.1)
ALT SERPL-CCNC: 84 U/L (ref 9–52)
AST SERPL-CCNC: 91 U/L (ref 14–36)
BUN SERPL-MCNC: 31 MG/DL (ref 7–17)
CALCIUM SERPL-MCNC: 8.7 MG/DL (ref 8.4–10.2)
GFR NON-AFRICAN AMERICAN: 31
INR PPP: 1.9 (ref 0.9–1.2)
PROTHROMBIN TIME: 21.2 SECONDS (ref 9.8–13.1)

## 2018-02-23 PROCEDURE — F08Z4FZ HOME MANAGEMENT TREATMENT USING ASSISTIVE, ADAPTIVE, SUPPORTIVE OR PROTECTIVE EQUIPMENT: ICD-10-PCS | Performed by: INTERNAL MEDICINE

## 2018-02-23 PROCEDURE — F07M6FZ THERAPEUTIC EXERCISE TREATMENT OF MUSCULOSKELETAL SYSTEM - WHOLE BODY USING ASSISTIVE, ADAPTIVE, SUPPORTIVE OR PROTECTIVE EQUIPMENT: ICD-10-PCS | Performed by: INTERNAL MEDICINE

## 2018-02-23 RX ADMIN — PANTOPRAZOLE SODIUM SCH MG: 20 TABLET, DELAYED RELEASE ORAL at 09:10

## 2018-02-23 NOTE — CP.PCM.PN
Subjective





- Date & Time of Evaluation


Date of Evaluation: 02/23/18


Time of Evaluation: 07:40





- Subjective


Subjective: 





                                                           Sat OOB most of the 

day yesterday, walked to the bath room with assistance


                                                           Slept well


                                                           HR 90 BPM, a fib


                                                           /70 mm Hg


                                                           No oedema over feet


                                                           JVP still flat


                                                           Few basal rales


                                                           S3 gallop+


                                                           Labs show: resolving 

INR (1.7 from 4.0 after IV Vit K)


                                                           BUN continues to drop


                                                           GFR 31ml/min


                                                           Electrolytes normal


                                                           Liver enzymes 

abnormal with elevated Silvano and Alk phos (suspect Amiodarone related)


                                                           Will evaluate pt for 

TCU





Objective





- Vital Signs/Intake and Output


Vital Signs (last 24 hours): 


 











Temp Pulse Resp BP Pulse Ox


 


 98.2 F   115 H  18   110/73   100 


 


 02/23/18 08:00  02/23/18 08:00  02/23/18 08:00  02/23/18 08:00  02/23/18 08:00











- Medications


Medications: 


 Current Medications





Atorvastatin Calcium (Lipitor)  20 mg PO HS [facility]


   Last Admin: 02/22/18 21:25 Dose:  20 mg


Iron Sucrose 200 mg/ Sodium (Chloride)  110 mls @ 110 mls/hr IVPB DAILY [facility]


   Last Admin: 02/22/18 11:19 Dose:  110 mls/hr


Levothyroxine Sodium (Synthroid)  75 mcg PO DAILY@0630 [facility]


   Last Admin: 02/23/18 05:44 Dose:  75 mcg


Metoprolol Tartrate (Lopressor)  100 mg PO Q12 [facility]


   Last Admin: 02/22/18 21:24 Dose:  Not Given


Pantoprazole Sodium (Protonix Ec Tab)  20 mg PO DAILY [facility]


   Last Admin: 02/22/18 09:17 Dose:  20 mg











- Labs


Labs: 


 





 02/22/18 05:30 





 02/23/18 04:50 





 











PT  21.2 Seconds (9.8-13.1)  H D 02/23/18  04:50    


 


INR  1.9  (0.9-1.2)  H D 02/23/18  04:50    


 


APTT  31.4 Seconds (25.6-37.1)   02/15/18  20:10

## 2018-02-24 LAB
ALBUMIN SERPL-MCNC: 2.8 G/DL (ref 3.5–5)
ALBUMIN/GLOB SERPL: 0.8 {RATIO} (ref 1–2.1)
ALT SERPL-CCNC: 81 U/L (ref 9–52)
AST SERPL-CCNC: 82 U/L (ref 14–36)
BUN SERPL-MCNC: 30 MG/DL (ref 7–17)
CALCIUM SERPL-MCNC: 8.6 MG/DL (ref 8.4–10.2)
GFR NON-AFRICAN AMERICAN: 29
INR PPP: 1.3 (ref 0.9–1.2)
PROTHROMBIN TIME: 14.7 SECONDS (ref 9.8–13.1)

## 2018-02-24 RX ADMIN — PANTOPRAZOLE SODIUM SCH MG: 20 TABLET, DELAYED RELEASE ORAL at 08:59

## 2018-02-24 NOTE — CP.PCM.HP
History of Present Illness





- History of Present Illness


History of Present Illness: 





                                                          this 78-year-old 

female with a long history of coronary artery disease which required coronary 

bypass graft surgery more than 30 years back and has subsequently required 

coronary stenting was recently hospitalized for dehydration, acute kidney 

injury and profound congestive cardiac failure.  The patient was gradually 

hydrated and is now in the transitional care unit before returning home. The 

patient has had severe left ventricular systolic dysfunction for which she 

required an AICD placement with biventricular pacing Ability. The patient has 

had atrial fibrillation and has had a heart rate exceeding the lower rate set 

for the pacer which has resulted  in mostly absence of pacing. consequently the 

patient cannot avail herself of the benefit of biventricular pacing.tthe 

patient had been given amiodarone in an attempt to slow her heart rate so as to 

have her in biventricular pacing mode. The patient has had atrial fibrillation 

for more than 3 years and has been on oral anticoagulation. She was 

hospitalized approximately month and a half back at another institution with an 

acute episode of gastrointestinal bleeding and workup for demonstrated evidence 

of a peptic ulceras the cause of her GI bleed. The patient has not had any 

further  gastrointestinal bleeding. Workup demonstrated markedly low iron 

stores and the patient has received intravenous iron over last 5 days amounting 

to approximately 1 g.  The patient was also found to have an elevated INR and 

has received intravenous vitamin K with prompt resolution of her elevated 

prothrombin time and INR. The patient has also shown a gradually increasing 

levels of AST and ALT  as well as a mild elevation in her bilirubin and 

alkaline phosphatase which is probably as a consequence of amiodarone which was 

given recently.


Physical examination shows an elderly exhausted female slightly pale but alert 

awake and concordant. Afebrile with a pulse rate of 90 bpm irregularly 

irregular. Her blood pressure was 96/70 mmHg. Her jugular venous pressure was 

not elevated and there was no pedal or sacral edema.  Her extremities were warm 

and her nailbeds were pink. There was no central or peripheral cyanosis. Her 

respiratory rate was 16-18 breaths per minute. A scar of sternotomy was 

evident. The apex wasn't a 6 pale slightly heaving in character. The first 

heart sound was muffled the second heart sound was normal and apical systolic 

murmur of mitral regurg was audible. There is a faint S3 gallop.


Her labs showed  now an INR of 1.3 having received intravenous  vitamin K 

yesterday from 1.7. Her BUN and creatinine were 30 and 1.7 mg percent 

respectively with a normal electrolyte panel. Her AST and ALT were elevated at 

82 and 81 units and her troponin was 2.1 mg percent serum albumen was 2.8 grams.





impression: congestive cardiac failure which is chronic left ventricular end-

systolic ( class III),  due to coronary artery disease with status post 

coronary bypass graft surgery. Chronic atrial fibrillation. Peptic ulcer 

disease. Stage III chronic kidney disease. Abnormal liver profile probably 

secondary to amiodarone. Abnormal coagulopathy secondary to vitamin K 

deficiency.





The patient continues to have slightly low systolic blood pressure and there is 

no overt evidence of volume overload. I have ordered cautious hydration with 

intravenous fluids at 40 mL per hour in an attempt to boost her intravascular 

volume without causing overt volume overload.  I have discussed her case with 

her electrophysiologist and the plans are to stabilize her and promptly carry 

out an AV node ablation along with replacement of AICD which at this point is 

at its end of life. The patient has been admitted to transitional care unit to 

improve her effort tolerance.





Present on Admission





- Present on Admission


Any Indicators Present on Admission: No





Past Patient History





- Infectious Disease


Hx of Infectious Diseases: None





- Past Medical History & Family History


Past Medical History?: Yes





- Past Social History


Smoking Status: Former Smoker





- CARDIAC


Hx Cardiac Disorders: Yes


Hx Atrial Fibrillation: Yes


Hx Congestive Heart Failure: Yes


Hx Heart Attack: Yes


Hx Hypercholesterolemia: Yes


Hx Hypertension: Yes


Hx Internal Defibrillator: Yes


Hx Pacemaker: Yes


Hx Peripheral Edema: Yes





- PULMONARY


Hx Respiratory Disorders: No





- NEUROLOGICAL


Hx Neurological Disorder: No





- HEENT


Hx HEENT Problems: No





- RENAL


Hx Chronic Kidney Disease: No





- ENDOCRINE/METABOLIC


Hx Endocrine Disorders: Yes


Hx Hypothyroidism: Yes





- HEMATOLOGICAL/ONCOLOGICAL


Hx Blood Disorders: Yes


Hx AIDS: No


Hx Human Immunodeficiency Virus (HIV): No





- INTEGUMENTARY


Hx Dermatological Problems: No





- MUSCULOSKELETAL/RHEUMATOLOGICAL


Hx Arthritis: Yes


Hx Falls: No


Hx Osteoporosis: Yes





- GASTROINTESTINAL


Hx Gastrointestinal Disorders: Yes


Hx Gastroesophageal Reflux: Yes (HEARTBURN)





- GENITOURINARY/GYNECOLOGICAL


Hx Genitourinary Disorders: No





- PSYCHIATRIC


Hx Anxiety: Yes


Hx Substance Use: No





- SURGICAL HISTORY


Hx Coronary Artery Bypass Graft: Yes (CARDIAC [id number])


Other/Comment: RIGHT BREAST CYSTECTOMY, AV ABLATION





- ANESTHESIA


Hx Anesthesia: Yes


Hx Anesthesia Reactions: No


Hx Malignant Hyperthermia: No





Meds


Allergies/Adverse Reactions: 


 Allergies











Allergy/AdvReac Type Severity Reaction Status Date / Time


 


No Known Allergies Allergy   Verified 02/23/18 15:38














Results





- Vital Signs


Recent Vital Signs: 





 Last Vital Signs











Temp  96.1 F L  02/24/18 08:51


 


Pulse  80   02/24/18 08:59


 


Resp  20   02/24/18 08:51


 


BP  96/60 L  02/24/18 08:59


 


Pulse Ox  100   02/24/18 08:51














- Labs


Result Diagrams: 


 02/24/18 08:14


Labs: 





 Laboratory Results - last 24 hr











  02/24/18 02/24/18





  08:14 08:14


 


PT  14.7 H D 


 


INR  1.3 H D 


 


Sodium   139


 


Potassium   3.8


 


Chloride   104


 


Carbon Dioxide   23


 


Anion Gap   16


 


BUN   30 H


 


Creatinine   1.7 H


 


Est GFR ( Amer)   35


 


Est GFR (Non-Af Amer)   29


 


Random Glucose   87


 


Calcium   8.6


 


Total Bilirubin   2.1 H


 


AST   82 H


 


ALT   81 H


 


Alkaline Phosphatase   145 H


 


Total Protein   6.1 L


 


Albumin   2.8 L


 


Globulin   3.3


 


Albumin/Globulin Ratio   0.8 L

## 2018-02-25 RX ADMIN — PANTOPRAZOLE SODIUM SCH MG: 20 TABLET, DELAYED RELEASE ORAL at 08:23

## 2018-02-26 LAB
ALBUMIN SERPL-MCNC: 3.1 G/DL (ref 3.5–5)
ALBUMIN/GLOB SERPL: 0.8 {RATIO} (ref 1–2.1)
ALT SERPL-CCNC: 78 U/L (ref 9–52)
AST SERPL-CCNC: 99 U/L (ref 14–36)
BUN SERPL-MCNC: 30 MG/DL (ref 7–17)
CALCIUM SERPL-MCNC: 9 MG/DL (ref 8.4–10.2)
ERYTHROCYTE [DISTWIDTH] IN BLOOD BY AUTOMATED COUNT: 19.5 % (ref 11.5–14.5)
ERYTHROCYTE [DISTWIDTH] IN BLOOD BY AUTOMATED COUNT: 19.9 % (ref 11.5–14.5)
GFR NON-AFRICAN AMERICAN: 31
HGB BLD-MCNC: 10.1 G/DL (ref 12–16)
HGB BLD-MCNC: 10.9 G/DL (ref 12–16)
INR PPP: 2.5 (ref 0.9–1.2)
MCH RBC QN AUTO: 23.5 PG (ref 27–31)
MCH RBC QN AUTO: 23.9 PG (ref 27–31)
MCHC RBC AUTO-ENTMCNC: 30.4 G/DL (ref 33–37)
MCHC RBC AUTO-ENTMCNC: 30.9 G/DL (ref 33–37)
MCV RBC AUTO: 77.3 FL (ref 81–99)
MCV RBC AUTO: 77.5 FL (ref 81–99)
PLATELET # BLD: 166 K/UL (ref 130–400)
PLATELET # BLD: 167 K/UL (ref 130–400)
PROTHROMBIN TIME: 28.5 SECONDS (ref 9.8–13.1)
RBC # BLD AUTO: 4.25 MIL/UL (ref 3.8–5.2)
RBC # BLD AUTO: 4.64 MIL/UL (ref 3.8–5.2)
WBC # BLD AUTO: 4.4 K/UL (ref 4.8–10.8)
WBC # BLD AUTO: 6.9 K/UL (ref 4.8–10.8)

## 2018-02-26 RX ADMIN — PANTOPRAZOLE SODIUM SCH MG: 20 TABLET, DELAYED RELEASE ORAL at 08:33

## 2018-02-26 NOTE — PCM.RRT
<Liz Lara - Last Filed: 02/26/18 16:40>





RRT Nurse Assessment





- Situation


RRT Responder Arrival Time: 03:15


Location: 7th floor 


Room Number: 708-1 


RRT Reason for Call: Hypotension (Systolic BP below 60s )


RRT Called By: RN





- IV


IV Inserted during RRT?: No





I.Reason for RRT





- A) Acute Change in Patient:


(Select all that apply): Acute change in SBP below (60 )


Subjective: 





RRT was called for acute change in systolic BP below 60 for 77 y/o female with 

PMH of coronary bypass graft surgery/stenting, AICD,   and CKD. Patient was 

doing her PT session and got diaphoretic and BP noted to be below 60 systolic. 

Patient was placed on trendelenburg position. upon arrival, patient was alert, 

awake, orientated, verbally responsive, follows commands. Patient denies chest 

pain, dizziness, SOB or vision changes. (Patient was given Lasix earlier today)

. EKG was ordered, shows no change from prior EKG. CBC was order, Dr. DESMOND Longoria 

made aware, who recommended observation.   





- Neurological Status


(Select all that apply): Alert, Responsive, Oriented, Verbal, Follows Commands





- Respiratory


Oxygen Delivery Method: Room Air





- Constitutional


Appears: No Acute Distress





- Eyes


Eye Exam: Normal appearance





- Respiratory Exam


Respiratory Exam: Clear to Ausculation Bilateral





- Cardiovascular Exam


Cardiovascular Exam: Irregular Rhythm





- GI/Abdominal Exam


GI & Abdominal Exam: Soft





- Neurological Exam


Neurological Exam: Alert, Awake, Oriented x3





<Zuleima WadeSusujomar Mondragon - Last Filed: 02/26/18 18:24>





RRT Nurse Assessment





- Vital Signs


Vital Signs: 


Rapid Response Vital Sign





Blood Pressure                   87/61


Pulse Rate                       105


Respiratory Rate                 26


Temperature                      98.7 F


Oxygen Saturation                93











- Vital Signs at end of RRT


Vital Signs at end of RRT: 


Rapid Response End Vital Sign





Blood Pressure                   95/64


Pulse Rate                       95


Respiratory Rate                 19


O2 Sat by Pulse Oximetry         99











Attending/Attestation





- Attestation


I have personally seen and examined this patient.: Yes


I have fully participated in the care of the patient.: Yes


I have reviewed all pertinent clinical information, including history, physical 

exam and plan: Yes


Notes (Text): 








RRT called because of hypotension


Pt was having Phsyical therapy .  Therapist noted pt to be diaphoretic  and 

checked her BP and noted in was in the 60's systolic.  Pt was brought back to 

her bed and on rpt BP check , she was back to her baseline w/c was 87/67.


Pt received Lasix 20 mg IV x 1 this am and had  metoprolol 100mg PO





On exam , pt is awake, alert, oriented


denies CP, no SOB


no abd pain'


She is on Xarelto


no melena 


EKG : no change








A/P : Hypotension ? Orthostatic 


- Rpt BP : 97/71


- discussed case with pt's PMD - DR DESMOND Longoria


- we will continue to monitor pt in TCU


-cont  Metoprolol


- CBC

## 2018-02-26 NOTE — PQF GENQUE
***** This form is a permanent part of the medical record*****

DR. MARTY RODRIGUEZ:



PATIENT DIAGNOSED WITH HEALING PEPTIC ULCER WITH ANEMIA.  COULD YOU PLEASE 
CLARIFY IF PEPTIC ULCER WAS STILL BLEEDING AND WAS ANEMIA ACUTE BLOOD LOSS.  
PRESENT ON ADMISSION.



          

Clarification of your documentation is requested to better reflect the severity 
of illness and intensity of treatment of your patient.  



Indicators present      



[]  Specify: []

         



[]  Specify: []         

 



[]  Specify: []         





[]  Specify: []         





Location in the medical record that reflects the above clinical findings:  []

 



Treatment Provided:  []

  

PHYSICIAN'S RESPONSE

Peptic Ulcer bled in early January 2017. Not bleeding now (Stool for occult 
blood was -ve)

Anemia is due to chronic blood loss. Anemia was present at admission.



Based on your medical judgment of the clinical indicators outlined above please 
clarify the following:



[]  Practitioner response 



[]  If unable to determine, please check the box, sign and date.  





Present On Admission (POA) Indicator:





[] Present at the time of admission 



[] Not present at the time of admission



[] Clinically Undetermined









In responding to this query, please exercise your independent professional 
judgment.  The fact that a question is asked does not imply that any particular 
answer is desired or expected.  Thank you for your clarification on this 
documentation.



If you have any questions please call:[ ]

* Thank you,

* AGUSTIN DOSS

   [[phone]

   HIM Coder
[facility]

## 2018-02-27 VITALS — RESPIRATION RATE: 20 BRPM

## 2018-02-27 LAB
ALBUMIN SERPL-MCNC: 3 G/DL (ref 3.5–5)
ALBUMIN/GLOB SERPL: 0.9 {RATIO} (ref 1–2.1)
ALT SERPL-CCNC: 75 U/L (ref 9–52)
AST SERPL-CCNC: 62 U/L (ref 14–36)
BUN SERPL-MCNC: 30 MG/DL (ref 7–17)
CALCIUM SERPL-MCNC: 9 MG/DL (ref 8.4–10.2)
GFR NON-AFRICAN AMERICAN: 29
INR PPP: 2.4 (ref 0.9–1.2)
PROTHROMBIN TIME: 26.8 SECONDS (ref 9.8–13.1)

## 2018-02-27 RX ADMIN — PANTOPRAZOLE SODIUM SCH MG: 20 TABLET, DELAYED RELEASE ORAL at 08:40

## 2018-02-27 NOTE — CP.PCM.PN
Subjective





- Date & Time of Evaluation


Date of Evaluation: 02/27/18


Time of Evaluation: 08:00





- Subjective


Subjective: 





                                                           Had a near syncopal 

episode due to hypotension (had received Lasix 20 mg due to vol over load)


                                                           Now resting in bed, 

nearly flat, breathes at 16 BPM


                                                           /70 mm Hg 

lying down /100/70 standing up


                                                           HR 88 BPM, irreg


                                                           No rales, no oedema 

over feet


                                                           Extremities warm


                                                           Labs: Hb10.9gms (Has 

received IV Fe)


                                                           BUN/Creatinin 30/1.7 

Mg (has not significantly changed mitzy after addl IV fluid over the weekend)


                                                           Electrolytes stable, 

normal


                                                           INR up to 2.4 (IV 

vit K ordered)


                                                           Liver profile approx 

same over last 2-3 days











                                                          Spoke with Dr. Kumar () re need to arrange A:V node ablation expidiciously


                                                          He agrees to bring pt 

over to [facility] as soon as a bed is available


                                                          Pt and the family 

agree.





Objective





- Vital Signs/Intake and Output


Vital Signs (last 24 hours): 


 











Temp Pulse Resp BP Pulse Ox


 


 97.2 F L  72   18   86/58 L  100 


 


 02/27/18 07:45  02/27/18 07:45  02/27/18 07:45  02/27/18 07:45  02/27/18 07:45











- Medications


Medications: 


 Current Medications





Alprazolam (Xanax)  0.25 mg PO HS [facility]


   PRN Reason: anxiety and insomnia


   Stop: 03/05/18 22:16


   Last Admin: 02/26/18 23:15 Dose:  0.25 mg


Atorvastatin Calcium (Lipitor)  20 mg PO HS [facility]


   Last Admin: 02/26/18 22:00 Dose:  20 mg


Levothyroxine Sodium (Synthroid)  75 mcg PO DAILY@0630 [facility]


   Last Admin: 02/27/18 05:43 Dose:  75 mcg


Metoprolol Tartrate (Lopressor)  100 mg PO Q12 [facility]


   Last Admin: 02/26/18 22:00 Dose:  100 mg


Pantoprazole Sodium (Protonix Ec Tab)  20 mg PO DAILY [facility]


   Last Admin: 02/26/18 08:33 Dose:  20 mg


Phytonadione (Vitamin K Inj)  10 mg IV ONCE ONE


   Stop: 02/27/18 08:14


Rivaroxaban (Xarelto)  15 mg PO QD5 [facility]


   PRN Reason: Protocol


   Last Admin: 02/26/18 16:08 Dose:  15 mg











- Labs


Labs: 


 





 02/26/18 15:26 





 02/27/18 05:45 





 











PT  26.8 Seconds (9.8-13.1)  H  02/27/18  05:45    


 


INR  2.4  (0.9-1.2)  H  02/27/18  05:45

## 2018-02-28 VITALS — DIASTOLIC BLOOD PRESSURE: 58 MMHG | SYSTOLIC BLOOD PRESSURE: 97 MMHG | TEMPERATURE: 97 F | OXYGEN SATURATION: 98 %

## 2018-02-28 VITALS — HEART RATE: 86 BPM

## 2018-02-28 LAB
ALBUMIN SERPL-MCNC: 2.9 G/DL (ref 3.5–5)
ALBUMIN/GLOB SERPL: 0.9 {RATIO} (ref 1–2.1)
ALT SERPL-CCNC: 70 U/L (ref 9–52)
AST SERPL-CCNC: 56 U/L (ref 14–36)
BUN SERPL-MCNC: 28 MG/DL (ref 7–17)
CALCIUM SERPL-MCNC: 9 MG/DL (ref 8.4–10.2)
GFR NON-AFRICAN AMERICAN: 31
INR PPP: 2.5 (ref 0.9–1.2)
PROTHROMBIN TIME: 27.7 SECONDS (ref 9.8–13.1)

## 2018-02-28 NOTE — CP.PCM.DIS
Provider





- Provider


Date of Admission: 


02/23/18 15:38





Attending physician: 


Kimani Longoria MD





Time Spent in preparation of Discharge (in minutes): 30





Hospital Course





- Lab Results


Lab Results: 


 Most Recent Lab Values











WBC  6.9 K/uL (4.8-10.8)  D 02/26/18  15:26    


 


RBC  4.64 Mil/uL (3.80-5.20)   02/26/18  15:26    


 


Hgb  10.9 g/dL (12.0-16.0)  L  02/26/18  15:26    


 


Hct  36.0 % (34.0-47.0)   02/26/18  15:26    


 


MCV  77.5 fl (81.0-99.0)  L  02/26/18  15:26    


 


MCH  23.5 pg (27.0-31.0)  L  02/26/18  15:26    


 


MCHC  30.4 g/dL (33.0-37.0)  L  02/26/18  15:26    


 


RDW  19.9 % (11.5-14.5)  H  02/26/18  15:26    


 


Plt Count  167 K/uL (130-400)   02/26/18  15:26    


 


PT  27.7 Seconds (9.8-13.1)  H  02/28/18  05:45    


 


INR  2.5  (0.9-1.2)  H  02/28/18  05:45    


 


Sodium  138 mmol/l (132-148)   02/28/18  05:45    


 


Potassium  3.7 MMOL/L (3.6-5.0)   02/28/18  05:45    


 


Chloride  104 mmol/L ()   02/28/18  05:45    


 


Carbon Dioxide  22 mmol/L (22-30)   02/28/18  05:45    


 


Anion Gap  16  (10-20)   02/28/18  05:45    


 


BUN  28 mg/dl (7-17)  H  02/28/18  05:45    


 


Creatinine  1.6 mg/dl (0.7-1.2)  H  02/28/18  05:45    


 


Est GFR ( Amer)  38   02/28/18  05:45    


 


Est GFR (Non-Af Amer)  31   02/28/18  05:45    


 


POC Glucose (mg/dL)  121 mg/dL ()  H  02/26/18  15:16    


 


Random Glucose  89 mg/dL ()   02/28/18  05:45    


 


Calcium  9.0 mg/dL (8.4-10.2)   02/28/18  05:45    


 


Total Bilirubin  2.1 mg/dl (0.2-1.3)  H  02/28/18  05:45    


 


AST  56 U/L (14-36)  H  02/28/18  05:45    


 


ALT  70 U/L (9-52)  H  02/28/18  05:45    


 


Alkaline Phosphatase  152 U/L ()  H  02/28/18  05:45    


 


Total Protein  6.2 G/DL (6.3-8.2)  L  02/28/18  05:45    


 


Albumin  2.9 g/dL (3.5-5.0)  L  02/28/18  05:45    


 


Globulin  3.3 gm/dL (2.2-3.9)   02/28/18  05:45    


 


Albumin/Globulin Ratio  0.9  (1.0-2.1)  L  02/28/18  05:45    














- Hospital Course


Hospital Course: 





                                                 this 78-year-old female was 

brought to the transitional care unit on 02/23/2017 and discharged on 02/28/ 2017.


Her final diagnosis was congestive cardiac failure,, left ventricular chronic 

and systolic secondary to coronary artery disease with status post coronary 

bypass graft surgery. Chronic atrial fibrillation  with status post AICD 

implant. Recent gastrointestinal bleeding secondary to peptic ulcer with iron 

deficiency anemia. Mild hepatic dysfunction secondary to recent amiodarone use. 

Stage III  chronic kidney disease. History of dyslipidemia and hypertension..


The patient was recently hospitalized to the acute care section of this 

hospital with severe dehydration and received intravenous fluids with 

significant resolution of severe  azotemia. She had also arrived with 

significantly elevated INR which promptly responded to intravenous vitamin K.. 

She had not been on warfarin for the last couple of years. The patient had been 

significantly hypotensive with her systolic blood pressure in the range of 80 

to 100 mmHg. She had recently received intravenous iron  and responded well 

raising her hemoglobin from 7.9 g to 10.9 g. Her BUN and creatinine at 

admission was 30 and 1.7 mg percent respectively on the day of her discharge it 

was 28 and 1.6 mg percent respectively with a GFR going from 29-31 mL per 

minute. Her total bilirubin conner as high as 2.7 mg percent on February 26  and 

by the time of her discharge was 2.1 mg percent while her AST  and ALT which 

where 99 units and 78 units respectively on February 26 were down to 56 and 70 

units respectively by the time of her discharge on 28th. Her alkaline 

phosphatase also went down from 158 units on 26th of February  to 152 units by 

the time of her discharge on 28th. In spite of receiving intravenous vitamin K  

her INR remained at 2.5 by the time of her discharge. The patient did not 

demonstrate any overt volume overload but continued to have a systolic blood 

pressure in the range of 90 to 104 mmHg throughout herstay in the transitional 

care unit along with profound fatigue..


The patient had had a bi-V pacer. Because of her atrial fibrillation with a 

persistent tachycardia she could not avail herself of the benefit of a bi-V 

pacer. This was discussed with her electrophysiologist and the decision had 

been made to put her through a AV ablation procedure which was planned for 

months. Finally when she was stable it was decided that she should be 

transferred to [facility] for this procedure. Recently her AICD 

was found to be at the end of its life and plans had been maintained to replace 

this device as well which would be done as well.


At the time of her discharge the patient was hemodynamically stable.








Discharge Plan





- Follow Up Plan


Condition: GOOD


Disposition: HOME/ ROUTINE


Instructions:  Heart Failure, Adult (DC), Preventing Falls, Generalized 

Weakness (DC)


Referrals: 


Kimani Longoria MD [Staff Provider] - 


David Augustin MD [Medical Doctor] -

## 2018-02-28 NOTE — CP.PCM.PN
Subjective





- Date & Time of Evaluation


Date of Evaluation: 02/28/18


Time of Evaluation: 07:30





- Subjective


Subjective: 





                                                Could not sleep well due to 

anticipation of AM transfer to [facility] and EP procedure


                                                Denies any dyspnoea


                                                Sitting OOB in a chair, appears 

fatigued but comfortable


                                                Resp rate 16-18 BPM


                                                HR 84 BPM, irreg


                                                /70 mm Hg


                                                JVP flat, no oedema over feet


                                                S3 gallop+, no rales


                                                Today's labs:BUN/Creatinin 28/

1.6 Mg%


                                                Electrolytes normal


                                                INR still 2.7 (even ater IV Vit 

K yesterday)


                                                Silvano 2.1mg and AST/ALT slightly 

lower.














                                                 Pt being sent to [facility] for A:V node ablation and AICD replacement


                                                 Have discussed pt hospital 

course so far with EP throgh this hospital stay.





Objective





- Vital Signs/Intake and Output


Vital Signs (last 24 hours): 


 











Temp Pulse Resp BP Pulse Ox


 


 97.0 F L  86   20   97/55 L  99 


 


 02/27/18 21:56  02/28/18 05:55  02/28/18 05:55  02/28/18 05:55  02/28/18 05:55











- Medications


Medications: 


 Current Medications





Alprazolam (Xanax)  0.25 mg PO HS PRN


   PRN Reason: anxiety and insomnia


   Stop: 03/05/18 22:16


   Last Admin: 02/27/18 22:58 Dose:  0.25 mg


Atorvastatin Calcium (Lipitor)  20 mg PO HS [facility]


   Last Admin: 02/27/18 21:12 Dose:  20 mg


Levothyroxine Sodium (Synthroid)  75 mcg PO DAILY@0630 [facility]


   Last Admin: 02/28/18 05:38 Dose:  Not Given


Metoprolol Tartrate (Lopressor)  100 mg PO Q12 [facility]


   Last Admin: 02/27/18 21:12 Dose:  100 mg


Pantoprazole Sodium (Protonix Ec Tab)  20 mg PO DAILY [facility]


   Last Admin: 02/27/18 08:40 Dose:  20 mg


Rivaroxaban (Xarelto)  15 mg PO QD5 [facility]


   PRN Reason: Protocol


   Last Admin: 02/27/18 16:39 Dose:  15 mg











- Labs


Labs: 


 





 02/26/18 15:26 





 02/28/18 05:45 





 











PT  27.7 Seconds (9.8-13.1)  H  02/28/18  05:45    


 


INR  2.5  (0.9-1.2)  H  02/28/18  05:45

## 2022-07-29 NOTE — CP.PCM.PN
Subjective





- Date & Time of Evaluation


Date of Evaluation: 02/17/18


Time of Evaluation: 09:00





- Subjective


Subjective: 





                                                      Generally comfortable,

unhappy about being hospitalised


                                                      Has received 80 ml NS/

hour over last 24 hrs


                                                      BP 96/70 mm Hg, A Fib at 

90 BPM ( Pacer in VVI mode)


                                                      BUN/Creatinin dropped 

further to 64/1.8 mg (GFR 27ml/min from 20 at admission)


                                                      K+ 3.3 mEq/L (K+ 

replacement ordered)


                                                      INR 2.7 (after Vit K 

replacement (???)


                                                      HB stable at 8.8 Gms


                                                      Abnormal liver 

chemestries (?? due to Amiodarone, will D/C it)


                                                      Have reduced IV fluids, 

pt taking oral nurishment


                                                      Will monitor CMP/CBC and 

INR


                                                      Pt has been sitting OOB


                                                      Pt well enough to leave 

ICU soon





Objective





- Vital Signs/Intake and Output


Vital Signs (last 24 hours): 


 











Temp Pulse Resp BP Pulse Ox


 


 98.5 F   87   30 H  98/62 L  100 


 


 02/17/18 08:00  02/17/18 08:00  02/17/18 08:00  02/17/18 08:00  02/17/18 08:00








Intake and Output: 


 











 02/17/18 02/17/18





 06:59 18:59


 


Intake Total 980 


 


Output Total 200 


 


Balance 780 














- Medications


Medications: 


 Current Medications





Alprazolam (Xanax)  0.25 mg PO HS [facility]


   Stop: 02/23/18 22:01


   Last Admin: 02/16/18 23:32 Dose:  0.25 mg


Atorvastatin Calcium (Lipitor)  20 mg PO HS [facility]


   Last Admin: 02/16/18 21:45 Dose:  20 mg


Sodium Chloride (Sodium Chloride 0.9%)  1,000 mls @ 30 mls/hr IV .Q24H [facility]


   Stop: 02/17/18 09:03


Levothyroxine Sodium (Synthroid)  75 mcg PO DAILY [facility]


   Last Admin: 02/16/18 08:47 Dose:  75 mcg


Metoprolol Succinate (Toprol Xl)  50 mg PO DAILY [facility]


   Last Admin: 02/16/18 10:53 Dose:  50 mg


Pantoprazole Sodium (Protonix Ec Tab)  20 mg PO DAILY [facility]


   Last Admin: 02/16/18 08:47 Dose:  20 mg


Potassium Chloride (K-Dur 20 Meq Er Tab)  20 meq PO BID [facility]











- Labs


Labs: 


 





 02/17/18 04:50 





 02/17/18 04:50 





 











PT  30.1 Seconds (9.8-13.1)  H D 02/17/18  04:50    


 


INR  2.7  (0.9-1.2)  H D 02/17/18  04:50    


 


APTT  31.4 Seconds (25.6-37.1)   02/15/18  20:10 General